# Patient Record
Sex: FEMALE | Race: BLACK OR AFRICAN AMERICAN | NOT HISPANIC OR LATINO | Employment: FULL TIME | URBAN - METROPOLITAN AREA
[De-identification: names, ages, dates, MRNs, and addresses within clinical notes are randomized per-mention and may not be internally consistent; named-entity substitution may affect disease eponyms.]

---

## 2020-11-09 ENCOUNTER — TRANSCRIBE ORDERS (OUTPATIENT)
Dept: NEUROLOGY | Facility: CLINIC | Age: 65
End: 2020-11-09

## 2020-11-09 DIAGNOSIS — M79.643 HAND PAIN: Primary | ICD-10-CM

## 2020-12-08 ENCOUNTER — TELEPHONE (OUTPATIENT)
Dept: NEUROLOGY | Facility: CLINIC | Age: 65
End: 2020-12-08

## 2020-12-10 ENCOUNTER — PROCEDURE VISIT (OUTPATIENT)
Dept: NEUROLOGY | Facility: CLINIC | Age: 65
End: 2020-12-10
Payer: COMMERCIAL

## 2020-12-10 DIAGNOSIS — M79.643 HAND PAIN: ICD-10-CM

## 2020-12-10 DIAGNOSIS — M79.643 PAIN OF HAND, UNSPECIFIED LATERALITY: Primary | ICD-10-CM

## 2020-12-10 PROCEDURE — 95910 NRV CNDJ TEST 7-8 STUDIES: CPT | Performed by: PSYCHIATRY & NEUROLOGY

## 2020-12-10 PROCEDURE — 95886 MUSC TEST DONE W/N TEST COMP: CPT | Performed by: PSYCHIATRY & NEUROLOGY

## 2023-10-10 ENCOUNTER — HOSPITAL ENCOUNTER (EMERGENCY)
Facility: HOSPITAL | Age: 68
End: 2023-10-10
Attending: EMERGENCY MEDICINE
Payer: COMMERCIAL

## 2023-10-10 ENCOUNTER — HOSPITAL ENCOUNTER (INPATIENT)
Facility: HOSPITAL | Age: 68
LOS: 2 days | Discharge: LEFT BEFORE DISCHARGE | End: 2023-10-12
Attending: INTERNAL MEDICINE | Admitting: INTERNAL MEDICINE
Payer: COMMERCIAL

## 2023-10-10 ENCOUNTER — APPOINTMENT (EMERGENCY)
Dept: RADIOLOGY | Facility: HOSPITAL | Age: 68
End: 2023-10-10
Payer: COMMERCIAL

## 2023-10-10 ENCOUNTER — APPOINTMENT (EMERGENCY)
Dept: CT IMAGING | Facility: HOSPITAL | Age: 68
End: 2023-10-10
Payer: COMMERCIAL

## 2023-10-10 ENCOUNTER — OFFICE VISIT (OUTPATIENT)
Dept: URGENT CARE | Facility: CLINIC | Age: 68
End: 2023-10-10
Payer: COMMERCIAL

## 2023-10-10 VITALS
RESPIRATION RATE: 20 BRPM | HEIGHT: 60 IN | HEART RATE: 97 BPM | TEMPERATURE: 98.9 F | DIASTOLIC BLOOD PRESSURE: 56 MMHG | OXYGEN SATURATION: 99 % | BODY MASS INDEX: 21.6 KG/M2 | SYSTOLIC BLOOD PRESSURE: 102 MMHG | WEIGHT: 110 LBS

## 2023-10-10 VITALS
SYSTOLIC BLOOD PRESSURE: 107 MMHG | DIASTOLIC BLOOD PRESSURE: 74 MMHG | HEART RATE: 102 BPM | RESPIRATION RATE: 18 BRPM | OXYGEN SATURATION: 99 % | TEMPERATURE: 101.7 F

## 2023-10-10 DIAGNOSIS — L03.119 CELLULITIS OF HAND: Primary | ICD-10-CM

## 2023-10-10 DIAGNOSIS — L03.90 CELLULITIS: ICD-10-CM
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
## 2023-10-10 DIAGNOSIS — R50.9 FEVER, UNSPECIFIED FEVER CAUSE: ICD-10-CM

## 2023-10-10 PROBLEM — Z91.018 MULTIPLE FOOD ALLERGIES: Status: ACTIVE | Noted: 2023-10-10

## 2023-10-10 LAB
ALBUMIN SERPL BCP-MCNC: 3.9 G/DL (ref 3.5–5)
ALP SERPL-CCNC: 45 U/L (ref 34–104)
ALT SERPL W P-5'-P-CCNC: 69 U/L (ref 7–52)
ANION GAP SERPL CALCULATED.3IONS-SCNC: 8 MMOL/L
AST SERPL W P-5'-P-CCNC: 64 U/L (ref 13–39)
BASOPHILS # BLD AUTO: 0.02 THOUSANDS/ÂΜL (ref 0–0.1)
BASOPHILS NFR BLD AUTO: 0 % (ref 0–1)
BILIRUB SERPL-MCNC: 0.57 MG/DL (ref 0.2–1)
BUN SERPL-MCNC: 26 MG/DL (ref 5–25)
CALCIUM SERPL-MCNC: 8.9 MG/DL (ref 8.4–10.2)
CHLORIDE SERPL-SCNC: 108 MMOL/L (ref 96–108)
CO2 SERPL-SCNC: 22 MMOL/L (ref 21–32)
CREAT SERPL-MCNC: 0.8 MG/DL (ref 0.6–1.3)
EOSINOPHIL # BLD AUTO: 0 THOUSAND/ÂΜL (ref 0–0.61)
EOSINOPHIL NFR BLD AUTO: 0 % (ref 0–6)
ERYTHROCYTE [DISTWIDTH] IN BLOOD BY AUTOMATED COUNT: 13.4 % (ref 11.6–15.1)
GFR SERPL CREATININE-BSD FRML MDRD: 75 ML/MIN/1.73SQ M
GLUCOSE SERPL-MCNC: 95 MG/DL (ref 65–140)
HCT VFR BLD AUTO: 38.9 % (ref 34.8–46.1)
HGB BLD-MCNC: 13.3 G/DL (ref 11.5–15.4)
IMM GRANULOCYTES # BLD AUTO: 0.04 THOUSAND/UL (ref 0–0.2)
IMM GRANULOCYTES NFR BLD AUTO: 0 % (ref 0–2)
LACTATE SERPL-SCNC: 0.8 MMOL/L (ref 0.5–2)
LYMPHOCYTES # BLD AUTO: 0.45 THOUSANDS/ÂΜL (ref 0.6–4.47)
LYMPHOCYTES NFR BLD AUTO: 5 % (ref 14–44)
MCH RBC QN AUTO: 33.6 PG (ref 26.8–34.3)
MCHC RBC AUTO-ENTMCNC: 34.2 G/DL (ref 31.4–37.4)
MCV RBC AUTO: 98 FL (ref 82–98)
MONOCYTES # BLD AUTO: 0.83 THOUSAND/ÂΜL (ref 0.17–1.22)
MONOCYTES NFR BLD AUTO: 9 % (ref 4–12)
NEUTROPHILS # BLD AUTO: 8.33 THOUSANDS/ÂΜL (ref 1.85–7.62)
NEUTS SEG NFR BLD AUTO: 86 % (ref 43–75)
NRBC BLD AUTO-RTO: 0 /100 WBCS
PLATELET # BLD AUTO: 161 THOUSANDS/UL (ref 149–390)
PMV BLD AUTO: 10.3 FL (ref 8.9–12.7)
POTASSIUM SERPL-SCNC: 3.4 MMOL/L (ref 3.5–5.3)
PROCALCITONIN SERPL-MCNC: 1.82 NG/ML
PROT SERPL-MCNC: 6.6 G/DL (ref 6.4–8.4)
RBC # BLD AUTO: 3.96 MILLION/UL (ref 3.81–5.12)
SODIUM SERPL-SCNC: 138 MMOL/L (ref 135–147)
WBC # BLD AUTO: 9.67 THOUSAND/UL (ref 4.31–10.16)

## 2023-10-10 PROCEDURE — 99223 1ST HOSP IP/OBS HIGH 75: CPT | Performed by: INTERNAL MEDICINE

## 2023-10-10 PROCEDURE — 73201 CT UPPER EXTREMITY W/DYE: CPT

## 2023-10-10 PROCEDURE — 96367 TX/PROPH/DG ADDL SEQ IV INF: CPT

## 2023-10-10 PROCEDURE — 99285 EMERGENCY DEPT VISIT HI MDM: CPT

## 2023-10-10 PROCEDURE — 99285 EMERGENCY DEPT VISIT HI MDM: CPT | Performed by: EMERGENCY MEDICINE

## 2023-10-10 PROCEDURE — 36415 COLL VENOUS BLD VENIPUNCTURE: CPT

## 2023-10-10 PROCEDURE — 80053 COMPREHEN METABOLIC PANEL: CPT

## 2023-10-10 PROCEDURE — 90471 IMMUNIZATION ADMIN: CPT

## 2023-10-10 PROCEDURE — 84145 PROCALCITONIN (PCT): CPT

## 2023-10-10 PROCEDURE — 99213 OFFICE O/P EST LOW 20 MIN: CPT | Performed by: PHYSICIAN ASSISTANT

## 2023-10-10 PROCEDURE — 87040 BLOOD CULTURE FOR BACTERIA: CPT

## 2023-10-10 PROCEDURE — 90715 TDAP VACCINE 7 YRS/> IM: CPT

## 2023-10-10 PROCEDURE — 85025 COMPLETE CBC W/AUTO DIFF WBC: CPT

## 2023-10-10 PROCEDURE — 73130 X-RAY EXAM OF HAND: CPT

## 2023-10-10 PROCEDURE — 96365 THER/PROPH/DIAG IV INF INIT: CPT

## 2023-10-10 PROCEDURE — 83605 ASSAY OF LACTIC ACID: CPT

## 2023-10-10 RX ORDER — ONDANSETRON 2 MG/ML
4 INJECTION INTRAMUSCULAR; INTRAVENOUS EVERY 6 HOURS PRN
Status: DISCONTINUED | OUTPATIENT
Start: 2023-10-10 | End: 2023-10-12 | Stop reason: HOSPADM

## 2023-10-10 RX ORDER — ACETAMINOPHEN 10 MG/ML
1000 INJECTION, SOLUTION INTRAVENOUS ONCE
Status: COMPLETED | OUTPATIENT
Start: 2023-10-10 | End: 2023-10-10

## 2023-10-10 RX ORDER — ACETAMINOPHEN 325 MG/1
650 TABLET ORAL EVERY 6 HOURS PRN
Status: DISCONTINUED | OUTPATIENT
Start: 2023-10-10 | End: 2023-10-11

## 2023-10-10 RX ORDER — KETOROLAC TROMETHAMINE 30 MG/ML
30 INJECTION, SOLUTION INTRAMUSCULAR; INTRAVENOUS EVERY 6 HOURS SCHEDULED
Status: DISCONTINUED | OUTPATIENT
Start: 2023-10-11 | End: 2023-10-11

## 2023-10-10 RX ORDER — ENOXAPARIN SODIUM 100 MG/ML
40 INJECTION SUBCUTANEOUS DAILY
Status: DISCONTINUED | OUTPATIENT
Start: 2023-10-11 | End: 2023-10-12 | Stop reason: HOSPADM

## 2023-10-10 RX ORDER — OXYCODONE HYDROCHLORIDE 5 MG/1
5 TABLET ORAL EVERY 4 HOURS PRN
Status: DISCONTINUED | OUTPATIENT
Start: 2023-10-10 | End: 2023-10-11

## 2023-10-10 RX ORDER — SODIUM CHLORIDE 9 MG/ML
100 INJECTION, SOLUTION INTRAVENOUS CONTINUOUS
Status: DISCONTINUED | OUTPATIENT
Start: 2023-10-10 | End: 2023-10-11

## 2023-10-10 RX ORDER — IBUPROFEN 400 MG/1
400 TABLET ORAL ONCE
Status: DISCONTINUED | OUTPATIENT
Start: 2023-10-10 | End: 2023-10-10

## 2023-10-10 RX ADMIN — AMPICILLIN SODIUM AND SULBACTAM SODIUM 3 G: 2; 1 INJECTION, POWDER, FOR SOLUTION INTRAMUSCULAR; INTRAVENOUS at 15:45

## 2023-10-10 RX ADMIN — IOHEXOL 100 ML: 350 INJECTION, SOLUTION INTRAVENOUS at 15:39

## 2023-10-10 RX ADMIN — AMPICILLIN SODIUM AND SULBACTAM SODIUM 3 G: 100; 50 INJECTION, POWDER, FOR SOLUTION INTRAVENOUS at 22:40

## 2023-10-10 RX ADMIN — ACETAMINOPHEN 1000 MG: 10 INJECTION INTRAVENOUS at 15:06

## 2023-10-10 RX ADMIN — IBUPROFEN 400 MG: 400 TABLET ORAL at 11:16

## 2023-10-10 RX ADMIN — SODIUM CHLORIDE 100 ML/HR: 0.9 INJECTION, SOLUTION INTRAVENOUS at 22:40

## 2023-10-10 RX ADMIN — SODIUM CHLORIDE 1000 ML: 0.9 INJECTION, SOLUTION INTRAVENOUS at 15:07

## 2023-10-10 RX ADMIN — KETOROLAC TROMETHAMINE 30 MG: 30 INJECTION, SOLUTION INTRAMUSCULAR; INTRAVENOUS at 23:38

## 2023-10-10 RX ADMIN — TETANUS TOXOID, REDUCED DIPHTHERIA TOXOID AND ACELLULAR PERTUSSIS VACCINE, ADSORBED 0.5 ML: 5; 2.5; 8; 8; 2.5 SUSPENSION INTRAMUSCULAR at 15:06

## 2023-10-10 NOTE — ED PROVIDER NOTES
History  Chief Complaint   Patient presents with   • Cat Bite     Pt bit by her cat this morning at 2am, pt bit twice on R hand. Strictly indoor cat      74 y/o female patient presents to the ER for cat bite. Patient stated that she was bitten by her cat around 0200 this morning. Throughout the day today her right wrist and forearm has become swollen with erythema spreading up her forearm from wound. There are noted four puncture wounds on the palmar aspect and volar wrist. Patient was seen by urgent care and sent to ER for further evaluation. Patient stated that the cat is strictly indoor cat. Cat is not up to date on vaccines. Cat is able to be observed for 10 days. patient does not know when she had a tetanus vaccine. Complains of decreased range of motion due to swelling. Sensation intact. Neurovascularly intact. History provided by:  Patient      Prior to Admission Medications   Prescriptions: None   Facility-Administered Medications Last Administration Doses Remaining   ibuprofen (MOTRIN) tablet 400 mg 10/10/2023 11:16 AM 0          History reviewed. No pertinent past medical history. History reviewed. No pertinent surgical history. History reviewed. No pertinent family history. I have reviewed and agree with the history as documented. E-Cigarette/Vaping     E-Cigarette/Vaping Substances     Social History     Tobacco Use   • Smoking status: Unknown       Review of Systems   Constitutional: Negative for chills and fever. Respiratory: Negative for cough and shortness of breath. Cardiovascular: Negative for chest pain. Gastrointestinal: Negative for abdominal pain, diarrhea, nausea and vomiting. Genitourinary: Negative for dysuria and hematuria. Musculoskeletal: Positive for joint swelling. Negative for back pain. Skin: Positive for rash and wound. Negative for color change. Neurological: Negative for dizziness, syncope and headaches.    All other systems reviewed and are negative. Physical Exam  Physical Exam  Vitals and nursing note reviewed. Constitutional:       General: She is not in acute distress. Appearance: She is well-developed. HENT:      Head: Normocephalic and atraumatic. Right Ear: External ear normal.      Left Ear: External ear normal.   Eyes:      Conjunctiva/sclera: Conjunctivae normal.   Cardiovascular:      Rate and Rhythm: Regular rhythm. Tachycardia present. Pulses: Normal pulses. Heart sounds: Normal heart sounds. Pulmonary:      Effort: Pulmonary effort is normal. No respiratory distress. Breath sounds: Normal breath sounds. Abdominal:      Palpations: Abdomen is soft. Tenderness: There is no abdominal tenderness. Musculoskeletal:      Right hand: Swelling and tenderness present. Decreased range of motion. Normal sensation. Normal capillary refill. Normal pulse. Cervical back: Neck supple. Skin:     General: Skin is warm and dry. Capillary Refill: Capillary refill takes less than 2 seconds. Findings: Erythema (right wrist spread to armpit) present. Neurological:      Mental Status: She is alert and oriented to person, place, and time. Mental status is at baseline.    Psychiatric:         Mood and Affect: Mood normal.         Behavior: Behavior normal.         Vital Signs  ED Triage Vitals   Temperature Pulse Respirations Blood Pressure SpO2   10/10/23 1201 10/10/23 1201 10/10/23 1201 10/10/23 1201 10/10/23 1201   98.9 °F (37.2 °C) (!) 107 20 132/59 98 %      Temp Source Heart Rate Source Patient Position - Orthostatic VS BP Location FiO2 (%)   10/10/23 1201 10/10/23 1647 10/10/23 1647 10/10/23 1647 --   Oral Monitor Sitting Left arm       Pain Score       --                  Vitals:    10/10/23 1201 10/10/23 1647   BP: 132/59 102/56   Pulse: (!) 107 97   Patient Position - Orthostatic VS:  Sitting         Visual Acuity      ED Medications  Medications   sodium chloride 0.9 % bolus 1,000 mL (0 mL Intravenous Stopped 10/10/23 1709)   tetanus-diphtheria-acellular pertussis (BOOSTRIX) IM injection 0.5 mL (0.5 mL Intramuscular Given 10/10/23 1506)   acetaminophen (Ofirmev) injection 1,000 mg (0 mg Intravenous Stopped 10/10/23 1531)   ampicillin-sulbactam (UNASYN) 3 g in sodium chloride 0.9 % 100 mL IVPB (0 g Intravenous Stopped 10/10/23 1709)   iohexol (OMNIPAQUE) 350 MG/ML injection (MULTI-DOSE) 100 mL (100 mL Intravenous Given 10/10/23 1539)       Diagnostic Studies  Results Reviewed     Procedure Component Value Units Date/Time    Procalcitonin [[id number]]  (Abnormal) Collected: 10/10/23 1459    Lab Status: Final result Specimen: Blood from Arm, Left Updated: 10/10/23 1533     Procalcitonin 1.82 ng/ml     Comprehensive metabolic panel [[id number]]  (Abnormal) Collected: 10/10/23 1458    Lab Status: Final result Specimen: Blood from Arm, Left Updated: 10/10/23 1527     Sodium 138 mmol/L      Potassium 3.4 mmol/L      Chloride 108 mmol/L      CO2 22 mmol/L      ANION GAP 8 mmol/L      BUN 26 mg/dL      Creatinine 0.80 mg/dL      Glucose 95 mg/dL      Calcium 8.9 mg/dL      AST 64 U/L      ALT 69 U/L      Alkaline Phosphatase 45 U/L      Total Protein 6.6 g/dL      Albumin 3.9 g/dL      Total Bilirubin 0.57 mg/dL      eGFR 75 ml/min/1.73sq m     Narrative:      [place] guidelines for Chronic Kidney Disease (CKD):   •  Stage 1 with normal or high GFR (GFR > 90 mL/min/1.73 square meters)  •  Stage 2 Mild CKD (GFR = 60-89 mL/min/1.73 square meters)  •  Stage 3A Moderate CKD (GFR = 45-59 mL/min/1.73 square meters)  •  Stage 3B Moderate CKD (GFR = 30-44 mL/min/1.73 square meters)  •  Stage 4 Severe CKD (GFR = 15-29 mL/min/1.73 square meters)  •  Stage 5 End Stage CKD (GFR <15 mL/min/1.73 square meters)  Note: GFR calculation is accurate only with a steady state creatinine    Lactic acid, plasma (w/reflex if result > 2.0) [[id number]]  (Normal) Collected: 10/10/23 9605    Lab Status: Final result Specimen: Blood from Arm, Left Updated: 10/10/23 1527     LACTIC ACID 0.8 mmol/L     Narrative:      Result may be elevated if tourniquet was used during collection. CBC and differential [[id number]]  (Abnormal) Collected: 10/10/23 1458    Lab Status: Final result Specimen: Blood from Arm, Left Updated: 10/10/23 1507     WBC 9.67 Thousand/uL      RBC 3.96 Million/uL      Hemoglobin 13.3 g/dL      Hematocrit 38.9 %      MCV 98 fL      MCH 33.6 pg      MCHC 34.2 g/dL      RDW 13.4 %      MPV 10.3 fL      Platelets 535 Thousands/uL      nRBC 0 /100 WBCs      Neutrophils Relative 86 %      Immat GRANS % 0 %      Lymphocytes Relative 5 %      Monocytes Relative 9 %      Eosinophils Relative 0 %      Basophils Relative 0 %      Neutrophils Absolute 8.33 Thousands/µL      Immature Grans Absolute 0.04 Thousand/uL      Lymphocytes Absolute 0.45 Thousands/µL      Monocytes Absolute 0.83 Thousand/µL      Eosinophils Absolute 0.00 Thousand/µL      Basophils Absolute 0.02 Thousands/µL     Blood culture #1 [[id number]] Collected: 10/10/23 1458    Lab Status: In process Specimen: Blood from Arm, Left Updated: 10/10/23 1505    Blood culture #2 [[id number]] Collected: 10/10/23 1458    Lab Status: In process Specimen: Blood from Hand, Left Updated: 10/10/23 1505                 CT upper extremity w contrast right   Final Result by Calli Freitas MD (10/10 1627)      There is no deep intramuscular collection seen      Cellulitic changes at the volar aspect of the hand overlying the area of the thenar eminence   No lytic lesion or periosteal reaction      No elbow joint effusion      Workstation performed: [id number]         XR hand 3+ views RIGHT   Final Result by Vale Arreguin MD (10/10 1650)   Soft tissue swelling. No acute osseous abnormality.             Workstation performed: [id number]                    Procedures  Procedures         ED Course  ED Course as of 10/10/23 1719   Tue Oct 10, 2023   1436 Spoke with Hand surgery on call and recommended transfer to [facility] for admission to medicine. NPO after midnight, continue IV Abx, wound care. Patient in agreement for transport. PAC working on placement. SBIRT 22yo+    Flowsheet Row Most Recent Value   Initial Alcohol Screen: US AUDIT-C     1. How often do you have a drink containing alcohol? 0 Filed at: 10/10/2023 1709   2. How many drinks containing alcohol do you have on a typical day you are drinking? 0 Filed at: 10/10/2023 1709   3b. FEMALE Any Age, or MALE 65+: How often do you have 4 or more drinks on one occassion? 0 Filed at: 10/10/2023 1709   Audit-C Score 0 Filed at: 10/10/2023 1709   DIVYA: How many times in the past year have you. .. Used an illegal drug or used a prescription medication for non-medical reasons? Never Filed at: 10/10/2023 1709                    Medical Decision Making  59-year-old female presents to the ER for evaluation of local erythema, warmth, swelling concerning for cellulitis. Sensitivity/pain to light touch around the erythematous area. Positive lymphatic spread visible with no fluid pockets of fluctuance concerning for abscess noted. Patient was given Unasyn IV and IV Tylenol. Tetanus was updated today. Deferred rabies vaccine at this time due to cat being able to be observed. Patient's case was discussed with hand surgery and was advised to transfer patient to [facility] for continued antibiotics and possible procedure tomorrow if indicated. Patient agreed with plan of care and did decide to drive herself to [facility] for admission. Patient's case accepted by Dr. Evelio Diamond with consult to hand surgery. Amount and/or Complexity of Data Reviewed  Labs: ordered. Radiology: ordered. Risk  Prescription drug management.                Disposition  Final diagnoses:   Cat bite, initial encounter   Cellulitis     Time reflects when diagnosis was documented in both MDM as applicable and the Disposition within this note     Time User Action Codes Description Comment    10/10/2023  3:49 PM Cassy Beltre Add [W55.01XA] Cat bite, initial encounter     10/10/2023  3:49 PM Cassy Beltre Add [M63.97] Cellulitis       ED Disposition     ED Disposition   Transfer to Another Facility-In Network    Condition   --    Date/Time   Tue Oct 10, 2023  2:51 PM    Comment   Paola Louis should be transferred out to [facility]. MD Documentation    Flowsheet Row Most Recent Value   Patient Condition The patient has been stabilized such that within reasonable medical probability, no material deterioration of the patient condition or the condition of the unborn child(dasha) is likely to result from the transfer   Reason for Transfer Level of Care needed not available at this facility   Benefits of Transfer Specialized equipment and/or services available at the receiving facility (Include comment)________________________   Risks of Transfer Potential for delay in receiving treatment   Accepting Physician Dr. Mauro Turcios Name, [facility]. [facility] (Name & Tel number) [facility]   Sending MD Dr. Akiko Chris,  Cassy Beltre, [address] [facility]   Provider Certification General risk, such as traffic hazards, adverse weather conditions, rough terrain or turbulence, possible failure of equipment (including vehicle or aircraft), or consequences of actions of persons outside the control of the transport personnel      RN Documentation    [address] Name, [facility]. [facility] (Name & Tel number) [facility]      Follow-up Information    None         Patient's Medications    No medications on file       No discharge procedures on file.     PDMP Review     None          ED Provider  Electronically Signed by           Cassy Beltre, [address] [facility]  10/10/23 1720

## 2023-10-10 NOTE — PLAN OF CARE
Problem: PAIN - ADULT  Goal: Verbalizes/displays adequate comfort level or baseline comfort level  Description: Interventions:  - Encourage patient to monitor pain and request assistance  - Assess pain using appropriate pain scale  - Administer analgesics based on type and severity of pain and evaluate response  - Implement non-pharmacological measures as appropriate and evaluate response  - Consider cultural and social influences on pain and pain management  - Notify physician/advanced practitioner if interventions unsuccessful or patient reports new pain  Outcome: Progressing     Problem: INFECTION - ADULT  Goal: Absence or prevention of progression during hospitalization  Description: INTERVENTIONS:  - Assess and monitor for signs and symptoms of infection  - Monitor lab/diagnostic results  - Monitor all insertion sites, i.e. indwelling lines, tubes, and drains  - Monitor endotracheal if appropriate and nasal secretions for changes in amount and color  - [place] appropriate cooling/warming therapies per order  - Administer medications as ordered  - Instruct and encourage patient and family to use good hand hygiene technique  - Identify and instruct in appropriate isolation precautions for identified infection/condition  Outcome: Progressing  Goal: Absence of fever/infection during neutropenic period  Description: INTERVENTIONS:  - Monitor WBC    Outcome: Progressing     Problem: SAFETY ADULT  Goal: Patient will remain free of falls  Description: INTERVENTIONS:  - Educate patient/family on patient safety including physical limitations  - Instruct patient to call for assistance with activity   - Consult OT/PT to assist with strengthening/mobility   - Keep Call bell within reach  - Keep bed low and locked with side rails adjusted as appropriate  - Keep care items and personal belongings within reach  - Initiate and maintain comfort rounds  - Make Fall Risk Sign visible to staff  - Offer Toileting every  Hours, in advance of need  - Initiate/Maintain alarm  - Obtain necessary fall risk management equipment  - Apply yellow socks and bracelet for high fall risk patients  - Consider moving patient to room near nurses station  Outcome: Progressing  Goal: Maintain or return to baseline ADL function  Description: INTERVENTIONS:  -  Assess patient's ability to carry out ADLs; assess patient's baseline for ADL function and identify physical deficits which impact ability to perform ADLs (bathing, care of mouth/teeth, toileting, grooming, dressing, etc.)  - Assess/evaluate cause of self-care deficits   - Assess range of motion  - Assess patient's mobility; develop plan if impaired  - Assess patient's need for assistive devices and provide as appropriate  - Encourage maximum independence but intervene and supervise when necessary  - Involve family in performance of ADLs  - Assess for home care needs following discharge   - Consider OT consult to assist with ADL evaluation and planning for discharge  - Provide patient education as appropriate  Outcome: Progressing  Goal: Maintains/Returns to pre admission functional level  Description: INTERVENTIONS:  - Perform BMAT or MOVE assessment daily.   - Set and communicate daily mobility goal to care team and patient/family/caregiver. - Collaborate with rehabilitation services on mobility goals if consulted  - Perform Range of Motion  times a day. - Reposition patient every  hours.   - Dangle patient  times a day  - Stand patient  times a day  - Ambulate patient times a day  - Out of bed to chair  times a day   - Out of bed for meals  times a day  - Out of bed for toileting  - Record patient progress and toleration of activity level   Outcome: Progressing     Problem: DISCHARGE PLANNING  Goal: Discharge to home or other facility with appropriate resources  Description: INTERVENTIONS:  - Identify barriers to discharge w/patient and caregiver  - Arrange for needed discharge resources and transportation as appropriate  - Identify discharge learning needs (meds, wound care, etc.)  - Arrange for interpretive services to assist at discharge as needed  - Refer to Case Management Department for coordinating discharge planning if the patient needs post-hospital services based on physician/advanced practitioner order or complex needs related to functional status, cognitive ability, or social support system  Outcome: Progressing     Problem: Knowledge Deficit  Goal: Patient/family/caregiver demonstrates understanding of disease process, treatment plan, medications, and discharge instructions  Description: Complete learning assessment and assess knowledge base.   Interventions:  - Provide teaching at level of understanding  - Provide teaching via preferred learning methods  Outcome: Progressing

## 2023-10-10 NOTE — PROGRESS NOTES
[facility]        NAME: Trupti Mireles is a 75 y.o. female  : 1955    MRN: [id number]  DATE: October 10, 2023  TIME: 11:07 AM    Assessment and Plan   Cat bite, initial encounter [W55.01XA]  1. Cat bite, initial encounter  Transfer to other facility      2. Fever, unspecified fever cause  ibuprofen (MOTRIN) tablet 400 mg            Patient Instructions   She was given 400 of ibuprofen for fever. Patient will proceed to the ER via private vehicle. Follow up with PCP in 3-5 days. Proceed to  ER if symptoms worsen. Chief Complaint     Chief Complaint   Patient presents with   • Cat Bite     It happen this morning the cat bite her in the right hand and is now having chills, and body ache and pain don't go away. No tetus shot ever. History of Present Illness       HPI  This is a 59-year-old female here complaining of swelling of the right hand after a cat bite which she notes occurred at 2 AM this morning. She notes she clean the area after the cat bite and took Advil at 2 AM.  She currently is here with a temp and noting her hand pain is a 9 out of 10. She notes there is swelling and red streaking up her arm. She denies vomiting, diarrhea, shortness of breath or chest pain. Review of Systems   Review of Systems   Constitutional: Positive for fever. Respiratory: Negative for shortness of breath. Cardiovascular: Negative for chest pain. Gastrointestinal: Negative for diarrhea and vomiting. Musculoskeletal: Positive for arthralgias. Skin: Positive for wound. Current Medications     No current outpatient medications on file.     Current Facility-Administered Medications:   •  ibuprofen (MOTRIN) tablet 400 mg, 400 mg, Oral, Once, Lindsey Mcfarland PA-C    Current Allergies     Allergies as of 10/10/2023 - Reviewed 10/10/2023   Allergen Reaction Noted   • Sugar-protein-starch - food allergy Hives 10/10/2023            The following portions of the patient's history were reviewed and updated as appropriate: allergies, current medications, past family history, past medical history, past social history, past surgical history and problem list.     No past medical history on file. No past surgical history on file. History reviewed. No pertinent family history. Medications have been verified. Objective   /74   Pulse 102   Temp (!) 101.7 °F (38.7 °C)   Resp 18   SpO2 99%        Physical Exam     Physical Exam  Vitals and nursing note reviewed. Constitutional:       General: She is not in acute distress. Appearance: She is ill-appearing. Cardiovascular:      Rate and Rhythm: Normal rate and regular rhythm. Pulmonary:      Effort: Pulmonary effort is normal.      Breath sounds: Normal breath sounds. Musculoskeletal:      Right hand: Swelling, laceration and tenderness present. Decreased range of motion. Normal capillary refill. Left hand: Normal.      Comments: There is significant edema, induration, and pain to palpation of the thumb and palm of the right hand. There is noted puncture marks on the palm. There is also noted streaking up the arm. Patient has limited motion of the thumb. See photo.

## 2023-10-10 NOTE — DISCHARGE INSTRUCTIONS
Patient to go to [facility] floor 4 room 434  Patient accepted under Dr. Beni Pimentel

## 2023-10-10 NOTE — EMTALA/ACUTE CARE TRANSFER
[address] [facility] [address]  Dept: [phone]      EMTALA TRANSFER CONSENT    NAME Rosie Lane                                         1955                              MRN [id number]    I have been informed of my rights regarding examination, treatment, and transfer   by Dr. Sunita Lowe MD    Benefits: Specialized equipment and/or services available at the receiving facility (Include comment)________________________    Risks: Potential for delay in receiving treatment      Consent for Transfer:  I acknowledge that my medical condition has been evaluated and explained to me by the emergency department physician or other qualified medical person and/or my attending physician, who has recommended that I be transferred to the service of  Accepting Physician: Dr. Ramón Romero at [address] [place] [address] [address] Name, [address] : [address] [place] [address]. The above potential benefits of such transfer, the potential risks associated with such transfer, and the probable risks of not being transferred have been explained to me, and I fully understand them. The doctor has explained that, in my case, the benefits of transfer outweigh the risks. I agree to be transferred. I authorize the performance of emergency medical procedures and treatments upon me in both transit and upon arrival at the receiving facility. Additionally, I authorize the release of any and all medical records to the receiving facility and request they be transported with me, if possible. I understand that the safest mode of transportation during a medical emergency is an ambulance and that the Hospital advocates the use of this mode of transport. Risks of traveling to the receiving facility by car, including absence of medical control, life sustaining equipment, such as oxygen, and medical personnel has been explained to me and I fully understand them.     ([address] Quality  BELOW)  [  ]  I consent to the stated transfer and to be transported by ambulance/helicopter. [  ]  I consent to the stated transfer, but refuse transportation by ambulance and accept full responsibility for my transportation by car. I understand the risks of non-ambulance transfers and I exonerate the Hospital and its staff from any deterioration in my condition that results from this refusal.    X___________________________________________    DATE  10/10/23  TIME________  Signature of patient or legally responsible individual signing on patient behalf           RELATIONSHIP TO PATIENT_________________________          Provider Certification    NAME Damian [facility]                                         1955                              MRN [id number]    A medical screening exam was performed on the above named patient. Based on the examination:    Condition Necessitating Transfer There were no encounter diagnoses. Patient Condition: The patient has been stabilized such that within reasonable medical probability, no material deterioration of the patient condition or the condition of the unborn child(dasha) is likely to result from the transfer    Reason for Transfer: Level of Care needed not available at this facility    Transfer Requirements: [address]   · Space available and qualified personnel available for treatment as acknowledged by Magdalene Denver  · Agreed to accept transfer and to provide appropriate medical treatment as acknowledged by       Dr. Maribell Bates  · Appropriate medical records of the examination and treatment of the patient are provided at the time of transfer   [address] [facility] [address] _______  · Transfer will be performed by qualified personnel from    and appropriate transfer equipment as required, including the use of necessary and appropriate life support measures.     Provider Certification: I have examined the patient and explained the following risks and benefits of being transferred/refusing transfer to the patient/family:  General risk, such as traffic hazards, adverse weather conditions, rough terrain or turbulence, possible failure of equipment (including vehicle or aircraft), or consequences of actions of persons outside the control of the transport personnel      Based on these reasonable risks and benefits to the patient and/or the unborn child(dasha), and based upon the information available at the time of the patient’s examination, I certify that the medical benefits reasonably to be expected from the provision of appropriate medical treatments at another medical facility outweigh the increasing risks, if any, to the individual’s medical condition, and in the case of labor to the unborn child, from effecting the transfer.     X____________________________________________ DATE 10/10/23        TIME_______      ORIGINAL - SEND TO MEDICAL RECORDS   COPY - SEND WITH PATIENT DURING TRANSFER

## 2023-10-10 NOTE — ED NOTES
Dressing applied following the directions within the nursing communication     Wet dressing of 50% betadine and 50% NS, covered with a dry dressing.       Missael Cisneros  10/10/23 0851

## 2023-10-11 PROBLEM — R65.21 SEPTIC SHOCK (HCC): Status: ACTIVE | Noted: 2023-10-11

## 2023-10-11 PROBLEM — R11.0 NAUSEA: Status: ACTIVE | Noted: 2023-10-11

## 2023-10-11 PROBLEM — A41.9 SEPTIC SHOCK (HCC): Status: ACTIVE | Noted: 2023-10-11

## 2023-10-11 LAB
ANION GAP SERPL CALCULATED.3IONS-SCNC: 4 MMOL/L
ANISOCYTOSIS BLD QL SMEAR: PRESENT
BASOPHILS # BLD MANUAL: 0 THOUSAND/UL (ref 0–0.1)
BASOPHILS NFR MAR MANUAL: 0 % (ref 0–1)
BUN SERPL-MCNC: 17 MG/DL (ref 5–25)
CALCIUM SERPL-MCNC: 7.6 MG/DL (ref 8.4–10.2)
CHLORIDE SERPL-SCNC: 111 MMOL/L (ref 96–108)
CO2 SERPL-SCNC: 25 MMOL/L (ref 21–32)
CREAT SERPL-MCNC: 0.77 MG/DL (ref 0.6–1.3)
EOSINOPHIL # BLD MANUAL: 0.12 THOUSAND/UL (ref 0–0.4)
EOSINOPHIL NFR BLD MANUAL: 1 % (ref 0–6)
ERYTHROCYTE [DISTWIDTH] IN BLOOD BY AUTOMATED COUNT: 14.1 % (ref 11.6–15.1)
GFR SERPL CREATININE-BSD FRML MDRD: 79 ML/MIN/1.73SQ M
GLUCOSE SERPL-MCNC: 84 MG/DL (ref 65–140)
HCT VFR BLD AUTO: 30.2 % (ref 34.8–46.1)
HCT VFR BLD AUTO: 33 % (ref 34.8–46.1)
HGB BLD-MCNC: 10.2 G/DL (ref 11.5–15.4)
HGB BLD-MCNC: 10.9 G/DL (ref 11.5–15.4)
LACTATE SERPL-SCNC: 1 MMOL/L (ref 0.5–2)
LYMPHOCYTES # BLD AUTO: 0.94 THOUSAND/UL (ref 0.6–4.47)
LYMPHOCYTES # BLD AUTO: 8 % (ref 14–44)
MCH RBC QN AUTO: 33.4 PG (ref 26.8–34.3)
MCHC RBC AUTO-ENTMCNC: 33 G/DL (ref 31.4–37.4)
MCV RBC AUTO: 101 FL (ref 82–98)
METAMYELOCYTES NFR BLD MANUAL: 1 % (ref 0–1)
MONOCYTES # BLD AUTO: 0.47 THOUSAND/UL (ref 0–1.22)
MONOCYTES NFR BLD: 4 % (ref 4–12)
MYELOCYTES NFR BLD MANUAL: 2 % (ref 0–1)
NEUTROPHILS # BLD MANUAL: 9.85 THOUSAND/UL (ref 1.85–7.62)
NEUTS BAND NFR BLD MANUAL: 30 % (ref 0–8)
NEUTS SEG NFR BLD AUTO: 54 % (ref 43–75)
PLATELET # BLD AUTO: 135 THOUSANDS/UL (ref 149–390)
PLATELET BLD QL SMEAR: ABNORMAL
PMV BLD AUTO: 10.7 FL (ref 8.9–12.7)
POTASSIUM SERPL-SCNC: 3.5 MMOL/L (ref 3.5–5.3)
RBC # BLD AUTO: 3.26 MILLION/UL (ref 3.81–5.12)
RBC MORPH BLD: PRESENT
SODIUM SERPL-SCNC: 140 MMOL/L (ref 135–147)
WBC # BLD AUTO: 11.73 THOUSAND/UL (ref 4.31–10.16)

## 2023-10-11 PROCEDURE — 85027 COMPLETE CBC AUTOMATED: CPT | Performed by: INTERNAL MEDICINE

## 2023-10-11 PROCEDURE — 99222 1ST HOSP IP/OBS MODERATE 55: CPT | Performed by: STUDENT IN AN ORGANIZED HEALTH CARE EDUCATION/TRAINING PROGRAM

## 2023-10-11 PROCEDURE — 85014 HEMATOCRIT: CPT | Performed by: INTERNAL MEDICINE

## 2023-10-11 PROCEDURE — 85007 BL SMEAR W/DIFF WBC COUNT: CPT | Performed by: INTERNAL MEDICINE

## 2023-10-11 PROCEDURE — 80048 BASIC METABOLIC PNL TOTAL CA: CPT | Performed by: INTERNAL MEDICINE

## 2023-10-11 PROCEDURE — 99222 1ST HOSP IP/OBS MODERATE 55: CPT | Performed by: NURSE PRACTITIONER

## 2023-10-11 PROCEDURE — 85018 HEMOGLOBIN: CPT | Performed by: INTERNAL MEDICINE

## 2023-10-11 PROCEDURE — 83605 ASSAY OF LACTIC ACID: CPT | Performed by: NURSE PRACTITIONER

## 2023-10-11 PROCEDURE — 87081 CULTURE SCREEN ONLY: CPT | Performed by: NURSE PRACTITIONER

## 2023-10-11 PROCEDURE — 99232 SBSQ HOSP IP/OBS MODERATE 35: CPT | Performed by: INTERNAL MEDICINE

## 2023-10-11 RX ORDER — OXYCODONE HYDROCHLORIDE 5 MG/1
5 TABLET ORAL EVERY 4 HOURS PRN
Status: DISCONTINUED | OUTPATIENT
Start: 2023-10-11 | End: 2023-10-12 | Stop reason: HOSPADM

## 2023-10-11 RX ORDER — ACETAMINOPHEN 325 MG/1
975 TABLET ORAL EVERY 8 HOURS SCHEDULED
Status: DISCONTINUED | OUTPATIENT
Start: 2023-10-11 | End: 2023-10-12 | Stop reason: HOSPADM

## 2023-10-11 RX ORDER — ALBUMIN, HUMAN INJ 5% 5 %
12.5 SOLUTION INTRAVENOUS ONCE
Status: DISCONTINUED | OUTPATIENT
Start: 2023-10-11 | End: 2023-10-11

## 2023-10-11 RX ORDER — PANTOPRAZOLE SODIUM 40 MG/10ML
40 INJECTION, POWDER, LYOPHILIZED, FOR SOLUTION INTRAVENOUS
Status: DISCONTINUED | OUTPATIENT
Start: 2023-10-11 | End: 2023-10-12 | Stop reason: HOSPADM

## 2023-10-11 RX ORDER — ALBUMIN (HUMAN) 12.5 G/50ML
25 SOLUTION INTRAVENOUS ONCE
Status: COMPLETED | OUTPATIENT
Start: 2023-10-11 | End: 2023-10-12

## 2023-10-11 RX ORDER — AMOXICILLIN 250 MG
1 CAPSULE ORAL 2 TIMES DAILY PRN
Status: DISCONTINUED | OUTPATIENT
Start: 2023-10-11 | End: 2023-10-12 | Stop reason: HOSPADM

## 2023-10-11 RX ORDER — POLYETHYLENE GLYCOL 3350 17 G/17G
17 POWDER, FOR SOLUTION ORAL DAILY
Status: DISCONTINUED | OUTPATIENT
Start: 2023-10-11 | End: 2023-10-12 | Stop reason: HOSPADM

## 2023-10-11 RX ORDER — ALBUMIN (HUMAN) 12.5 G/50ML
12.5 SOLUTION INTRAVENOUS ONCE
Status: COMPLETED | OUTPATIENT
Start: 2023-10-11 | End: 2023-10-11

## 2023-10-11 RX ORDER — AMOXICILLIN 250 MG
1 CAPSULE ORAL AS NEEDED
Status: DISCONTINUED | OUTPATIENT
Start: 2023-10-11 | End: 2023-10-11

## 2023-10-11 RX ADMIN — ALBUMIN (HUMAN) 12.5 G: 0.25 INJECTION, SOLUTION INTRAVENOUS at 16:44

## 2023-10-11 RX ADMIN — AMPICILLIN SODIUM AND SULBACTAM SODIUM 3 G: 100; 50 INJECTION, POWDER, FOR SOLUTION INTRAVENOUS at 11:15

## 2023-10-11 RX ADMIN — SODIUM CHLORIDE 1000 ML: 0.9 INJECTION, SOLUTION INTRAVENOUS at 08:56

## 2023-10-11 RX ADMIN — SODIUM CHLORIDE 500 ML: 0.9 INJECTION, SOLUTION INTRAVENOUS at 17:39

## 2023-10-11 RX ADMIN — ALBUMIN (HUMAN) 25 G: 0.25 INJECTION, SOLUTION INTRAVENOUS at 21:02

## 2023-10-11 RX ADMIN — AMPICILLIN SODIUM AND SULBACTAM SODIUM 3 G: 100; 50 INJECTION, POWDER, FOR SOLUTION INTRAVENOUS at 16:59

## 2023-10-11 RX ADMIN — ONDANSETRON 4 MG: 2 INJECTION INTRAMUSCULAR; INTRAVENOUS at 15:02

## 2023-10-11 RX ADMIN — KETOROLAC TROMETHAMINE 30 MG: 30 INJECTION, SOLUTION INTRAMUSCULAR; INTRAVENOUS at 05:49

## 2023-10-11 RX ADMIN — CEFEPIME 2000 MG: 2 INJECTION, POWDER, FOR SOLUTION INTRAVENOUS at 23:39

## 2023-10-11 RX ADMIN — KETOROLAC TROMETHAMINE 30 MG: 30 INJECTION, SOLUTION INTRAMUSCULAR; INTRAVENOUS at 11:16

## 2023-10-11 RX ADMIN — AMPICILLIN SODIUM AND SULBACTAM SODIUM 3 G: 100; 50 INJECTION, POWDER, FOR SOLUTION INTRAVENOUS at 22:39

## 2023-10-11 RX ADMIN — AMPICILLIN SODIUM AND SULBACTAM SODIUM 3 G: 100; 50 INJECTION, POWDER, FOR SOLUTION INTRAVENOUS at 04:44

## 2023-10-11 NOTE — UTILIZATION REVIEW
Initial Clinical Review    Admission: Date/Time/Statement:   Admission Orders (From admission, onward)       Ordered        10/10/23 1936  Inpatient Admission  Once                          Orders Placed This Encounter   Procedures    Inpatient Admission     Standing Status:   Standing     Number of Occurrences:   1     Order Specific Question:   Level of Care     Answer:   Med Surg [16]     Order Specific Question:   Estimated length of stay     Answer:   More than 2 Midnights     Order Specific Question:   Certification     Answer:   I certify that inpatient services are medically necessary for this patient for a duration of greater than two midnights. See H&P and MD Progress Notes for additional information about the patient's course of treatment. Initial Presentation:Transferred from [facility]  75 y.o. female with a PMH of multiple food allergies and also fibroid uterus for which she had hysterectomy who presents with swelling and pain of her right hand following 2 bites by her cat around 2 AM last night. The patient states that the cat is her own cat that she has had for about 2 years since the cat was 4 weeks old and she states that the cat has been in heat and fighting with its sister and when the patient was giving the cat water to try to quell the cat, the cat bit her. The patient states that she was in shock as the cat had never bitten her before and had then gone to push the cat away but the cat bit her second time much harder. The patient stated that her hand was "gushing blood" after the 2 bites and she initially went over to the urgent care. The patient notes that when she was at the urgent care that she had shaking chills/rigors all over to the point where she cannot even fill out papers on her own. She noted that when they took her temperature it was about 101.9 and she was given Tylenol.   The patient was sent to the hospital for evaluation and when seen in the emergency department at the [facility], she was given tetanus vaccine but because of difficulty that she was having with moving the hand, it was felt that the patient would require hand surgery evaluation to evaluate for potential deeper infection such as a tenosynovitis. Transferred to [address] [place] [address],[address] admitted to inpatient status for cellulitis hand. Started on IVABT, cultures pending. Date: 10/11/23   Day 2:   IVABT in progress for cellulitis hand s/p cat bite. Ortho consulted. Elevate & NWB R hand, heat to affected area. NPO after MN pending re-evaluation in am per ortho    Per ortho: right hand and wrist swelling, s/p cat bite   Exam:  Right hand with moderate swelling to the palm. Small puncture wounds over the thenar musculature. FPL EPL intact. FDS and FDP intact to all digits. Able to make a loose fist.  Full digit extension. No palpable fluid collections. One of the most distal wound. tiny amount of purulence noted at distal wound  5/5 APB strength  Two-point discrimination intact to all digits over radial and ulnar aspect.     ED Triage Vitals   Temperature Pulse Respirations Blood Pressure SpO2   10/10/23 1827 10/10/23 1830 10/10/23 1827 10/10/23 1827 10/10/23 1830   98.4 °F (36.9 °C) (!) 112 18 99/61 94 %      Temp Source Heart Rate Source Patient Position - Orthostatic VS BP Location FiO2 (%)   10/10/23 1830 -- -- 10/11/23 0816 --   Oral   Left arm       Pain Score       10/10/23 1820       7          Wt Readings from Last 1 Encounters:   10/10/23 49.9 kg (110 lb 0.2 oz)     Additional Vital Signs:   Date/Time Temp Pulse Resp BP MAP (mmHg) SpO2   10/11/23 0845 -- -- -- 80/48 Abnormal   -- --   BP: provider notified at 10/11/23 0845   10/11/23 0816 -- -- -- 86/58 Abnormal  -- --   10/11/23 08:04:35 97.8 °F (36.6 °C) 78 18 -- -- 96 %   10/10/23 23:50:06 98.1 °F (36.7 °C) 93 16 99/60 73 98 %   10/10/23 18:30:11 98.4 °F (36.9 °C) 112 Abnormal  18 99/61 74 94 %   10/10/23 18:27:23 98.4 °F (36.9 °C) -- 18 99/61 74 -- Pertinent Labs/Diagnostic Test Results:   10/10 CT RUE @ [facility] prior to transfer  There is no deep intramuscular collection seen  Cellulitic changes at the volar aspect of the hand overlying the area of the thenar eminence  No lytic lesion or periosteal reaction  No elbow joint effusion    10/10 XR hand 3+ views RIGHT @ [facility] prior to transfer  Soft tissue swelling. No acute osseous abnormality. Results from last 7 days   Lab Units 10/11/23  0604 10/10/23  1458   WBC Thousand/uL 11.73* 9.67   HEMOGLOBIN g/dL 10.9* 13.3   HEMATOCRIT % 33.0* 38.9   PLATELETS Thousands/uL 135* 161   NEUTROS ABS Thousands/µL  --  8.33*   BANDS PCT % 30*  --          Results from last 7 days   Lab Units 10/11/23  0604 10/10/23  1458   SODIUM mmol/L 140 138   POTASSIUM mmol/L 3.5 3.4*   CHLORIDE mmol/L 111* 108   CO2 mmol/L 25 22   ANION GAP mmol/L 4 8   BUN mg/dL 17 26*   CREATININE mg/dL 0.77 0.80   EGFR ml/min/1.73sq m 79 75   CALCIUM mg/dL 7.6* 8.9     Results from last 7 days   Lab Units 10/10/23  1458   AST U/L 64*   ALT U/L 69*   ALK PHOS U/L 45   TOTAL PROTEIN g/dL 6.6   ALBUMIN g/dL 3.9   TOTAL BILIRUBIN mg/dL 0.57       Results from last 7 days   Lab Units 10/11/23  0604 10/10/23  1458   GLUCOSE RANDOM mg/dL 84 95       Results from last 7 days   Lab Units 10/10/23  1459   PROCALCITONIN ng/ml 1.82*     Results from last 7 days   Lab Units 10/10/23  1458   LACTIC ACID mmol/L 0.8       Results from last 7 days   Lab Units 10/10/23  1458   BLOOD CULTURE  Received in Microbiology Lab. Culture in Progress. Received in Microbiology Lab. Culture in Progress.      Past Medical History:   Diagnosis Date    Fibrocystic breast changes of both breasts     Multiple food allergies     Post menopausal problems      Present on Admission:   Cat bite    Admitting Diagnosis: Cellulitis [L03.90]  Age/Sex: 75 y.o. female  Admission Orders:  Consult hand surgery  Scd/foot pumps  Elevate RUE    Scheduled Medications:  ampicillin-sulbactam, 3 g, Intravenous, Q6H  enoxaparin, 40 mg, Subcutaneous, Daily  ketorolac, 30 mg, Intravenous, Q6H SILVIANO  sodium chloride, 1,000 mL, Intravenous, Once    Continuous IV Infusions:  sodium chloride, 100 mL/hr, Intravenous, Continuous    PRN Meds:  acetaminophen, 650 mg, Oral, Q6H PRN  ondansetron, 4 mg, Intravenous, Q6H PRN  oxyCODONE, 5 mg, Oral, Q4H PRN  oxyCODONE, 2.5 mg, Oral, Q4H PRN      Network Utilization Review Department  ATTENTION: Please call with any questions or concerns to [phone] and carefully listen to the prompts so that you are directed to the right person. All voicemails are confidential.   For Discharge needs, contact Care Management DC Support Team at [phone] opt. 2  Send all requests for admission clinical reviews, approved or denied determinations and any other requests to dedicated fax number below belonging to the campus where the patient is receiving treatment.  List of dedicated fax numbers for the Facilities:  [place] DENIALS (Administrative/Medical Necessity) [phone]   DISCHARGE SUPPORT TEAM (NETWORK) [id number] [facility] (Maternity/NICU/Pediatrics) [phone]   [address] [facility] [phone]   [address] [place] [address] Road [address] [facility] [address] [facility] [address] [address] [facility] [address] [phone]

## 2023-10-11 NOTE — PLAN OF CARE
Problem: PAIN - ADULT  Goal: Verbalizes/displays adequate comfort level or baseline comfort level  Description: Interventions:  - Encourage patient to monitor pain and request assistance  - Assess pain using appropriate pain scale  - Administer analgesics based on type and severity of pain and evaluate response  - Implement non-pharmacological measures as appropriate and evaluate response  - Consider cultural and social influences on pain and pain management  - Notify physician/advanced practitioner if interventions unsuccessful or patient reports new pain  Outcome: Progressing     Problem: INFECTION - ADULT  Goal: Absence or prevention of progression during hospitalization  Description: INTERVENTIONS:  - Assess and monitor for signs and symptoms of infection  - Monitor lab/diagnostic results  - Monitor all insertion sites, i.e. indwelling lines, tubes, and drains  - Monitor endotracheal if appropriate and nasal secretions for changes in amount and color  - [place] appropriate cooling/warming therapies per order  - Administer medications as ordered  - Instruct and encourage patient and family to use good hand hygiene technique  - Identify and instruct in appropriate isolation precautions for identified infection/condition  Outcome: Progressing  Goal: Absence of fever/infection during neutropenic period  Description: INTERVENTIONS:  - Monitor WBC    Outcome: Progressing     Problem: SAFETY ADULT  Goal: Patient will remain free of falls  Description: INTERVENTIONS:  - Educate patient/family on patient safety including physical limitations  - Instruct patient to call for assistance with activity   - Consult OT/PT to assist with strengthening/mobility   - Keep Call bell within reach  - Keep bed low and locked with side rails adjusted as appropriate  - Keep care items and personal belongings within reach  - Initiate and maintain comfort rounds  - Make Fall Risk Sign visible to staff  - Offer Toileting every  Hours, in advance of need  - Initiate/Maintain alarm  - Obtain necessary fall risk management equipment  - Apply yellow socks and bracelet for high fall risk patients  - Consider moving patient to room near nurses station  Outcome: Progressing  Goal: Maintain or return to baseline ADL function  Description: INTERVENTIONS:  -  Assess patient's ability to carry out ADLs; assess patient's baseline for ADL function and identify physical deficits which impact ability to perform ADLs (bathing, care of mouth/teeth, toileting, grooming, dressing, etc.)  - Assess/evaluate cause of self-care deficits   - Assess range of motion  - Assess patient's mobility; develop plan if impaired  - Assess patient's need for assistive devices and provide as appropriate  - Encourage maximum independence but intervene and supervise when necessary  - Involve family in performance of ADLs  - Assess for home care needs following discharge   - Consider OT consult to assist with ADL evaluation and planning for discharge  - Provide patient education as appropriate  Outcome: Progressing  Goal: Maintains/Returns to pre admission functional level  Description: INTERVENTIONS:  - Perform BMAT or MOVE assessment daily.   - Set and communicate daily mobility goal to care team and patient/family/caregiver. - Collaborate with rehabilitation services on mobility goals if consulted  - Perform Range of Motion  times a day. - Reposition patient every  hours.   - Dangle patient  times a day  - Stand patient  times a day  - Ambulate patient times a day  - Out of bed to chair  times a day   - Out of bed for meals  times a day  - Out of bed for toileting  - Record patient progress and toleration of activity level   Outcome: Progressing     Problem: DISCHARGE PLANNING  Goal: Discharge to home or other facility with appropriate resources  Description: INTERVENTIONS:  - Identify barriers to discharge w/patient and caregiver  - Arrange for needed discharge resources and transportation as appropriate  - Identify discharge learning needs (meds, wound care, etc.)  - Arrange for interpretive services to assist at discharge as needed  - Refer to Case Management Department for coordinating discharge planning if the patient needs post-hospital services based on physician/advanced practitioner order or complex needs related to functional status, cognitive ability, or social support system  Outcome: Progressing     Problem: Knowledge Deficit  Goal: Patient/family/caregiver demonstrates understanding of disease process, treatment plan, medications, and discharge instructions  Description: Complete learning assessment and assess knowledge base.   Interventions:  - Provide teaching at level of understanding  - Provide teaching via preferred learning methods  Outcome: Progressing

## 2023-10-11 NOTE — CONSULTS
Orthopedics   Sergio Potter 75 y.o. female MRN: [id number]  Unit/Bed#: W -01      Chief Complaint:   right hand and wrist swelling, s/p cat bite    HPI:   75 y. o.female  who presents s/p cat bite to the hand. She was  two domestic cats at home, when one bit her, and then bit her a second time. Puncture marks are on the volar aspect of the palm. Prior to admission she was febrile to 101.9. Seh was seen in urgent care, and sent to the hospital for further evaluation. At time of consultation she reports she has had marked improvement in swelling and pain in the right hand. Overall reports about a 50% improvement to date. She is currently elevating her hand on blankets, and is under mild compression. She has no other complaints at present. Is not sure if her cats were vaccinated. But reports they have been to the vet. Her tetanus was updated in the ER.      Review Of Systems:   Skin: as per HPI  Neuro: See HPI  Musculoskeletal: See HPI  14 point review of systems negative except as stated above     Past Medical History:   Past Medical History:   Diagnosis Date    Fibrocystic breast changes of both breasts     Multiple food allergies     Post menopausal problems        Past Surgical History:   Past Surgical History:   Procedure Laterality Date    BREAST LUMPECTOMY Right     THYROID SURGERY      TOTAL ABDOMINAL HYSTERECTOMY W/ BILATERAL SALPINGOOPHORECTOMY Bilateral        Family History:  Family history reviewed and non-contributory  Family History   Problem Relation Age of Onset    Lung cancer Mother     Diabetes Father     Breast cancer Sister     Diabetes Sister     Breast cancer Sister     Stomach cancer Child        Social History:  Social History     Socioeconomic History    Marital status: /Civil Union     Spouse name: None    Number of children: None    Years of education: None    Highest education level: None   Occupational History    None   Tobacco Use    Smoking status: Never Passive exposure: Never    Smokeless tobacco: Never   Vaping Use    Vaping Use: Never used   Substance and Sexual Activity    Alcohol use: Never    Drug use: Never    Sexual activity: Yes     Partners: Male   Other Topics Concern    None   Social History Narrative    None     Social Determinants of Health     Financial Resource Strain: Not on file   Food Insecurity: Not on file   Transportation Needs: Not on file   Physical Activity: Not on file   Stress: Not on file   Social Connections: Not on file   Intimate Partner Violence: Not on file   Housing Stability: Not on file       Allergies: Allergies   Allergen Reactions    Sugar-Protein-Starch - Food Allergy Hives           Labs:  0   Lab Value Date/Time    HCT 33.0 (L) 10/11/2023 0604    HCT 38.9 10/10/2023 1458    HGB 10.9 (L) 10/11/2023 0604    HGB 13.3 10/10/2023 1458    WBC 11.73 (H) 10/11/2023 0604    WBC 9.67 10/10/2023 1458       Meds:    Current Facility-Administered Medications:     acetaminophen (TYLENOL) tablet 650 mg, 650 mg, Oral, Q6H PRN, Brenda Finner, DO    ampicillin-sulbactam (UNASYN) 3 g in sodium chloride 0.9 % 100 mL IVPB, 3 g, Intravenous, Q6H, Brenda Finner, DO, Last Rate: 200 mL/hr at 10/11/23 0444, 3 g at 10/11/23 0444    enoxaparin (LOVENOX) subcutaneous injection 40 mg, 40 mg, Subcutaneous, Daily, Brenda Finner, DO    ketorolac (TORADOL) injection 30 mg, 30 mg, Intravenous, Q6H [address], Brenda Finner, DO, 30 mg at 10/11/23 0549    ondansetron (ZOFRAN) injection 4 mg, 4 mg, Intravenous, Q6H PRN, Brenda Finner, DO    oxyCODONE (ROXICODONE) IR tablet 5 mg, 5 mg, Oral, Q4H PRN, Brenda Finner, DO    oxyCODONE (ROXICODONE) split tablet 2.5 mg, 2.5 mg, Oral, Q4H PRN, Brenda Finner, DO    Blood Culture:   Lab Results   Component Value Date    BLOODCX Received in Microbiology Lab. Culture in Progress. 10/10/2023    BLOODCX Received in Microbiology Lab. Culture in Progress.  10/10/2023       Wound Culture:   No results found for: "WOUNDCULT"    Ins and Outs:  No intake/output data recorded. Physical Exam:   BP (!) 80/48 (BP Location: Left arm) Comment: provider notified  Pulse 78   Temp 97.8 °F (36.6 °C)   Resp 18   Ht 5' (1.524 m)   Wt 49.9 kg (110 lb 0.2 oz)   SpO2 96%   BMI 21.48 kg/m²   Gen: No acute distress, resting comfortably in bed  HEENT: Eyes clear, moist mucus membranes, hearing intact  Respiratory: No audible wheezing or stridor  Cardiovascular: Well Perfused peripherally, 2+ distal pulse  Abdomen: nondistended, no peritoneal signs  Musculoskeletal: right Hand  Skin: Please see wound images for full characterization of wounds  She has diffuse swelling over the hand and into the forearm. She has puncture marks over the volar palm. No active purulence noted/appreciated. Tendons: FDP and FDS intact to all digits. FPL intact. Extensor mechanism intact to all digits. No significant pain with any flexion or extension of the digits or thumb. No significant pain with extension/flexion of the wrist.   Able to make a loose composite fist.   Sensation intact Axillary, Musculocutaneous, Radial, Ulna, and Median  5/5 motor to Axillary, Musculocutaneous, Radial, Ulna, and Median  2+ Radial & Ulnar Pulse  Musculature is soft and compressible               Radiology:   I personally reviewed the films. Imaging reviewed and discussed with Dr. Rubén Banks. X-rays right hand: no acute osseous abnormality  CT right upper extremity: no deep intramuscular collection. Cellulitic changes.     _*_*_*_*_*_*_*_*_*_*_*_*_*_*_*_*_*_*_*_*_*_*_*_*_*_*_*_*_*_*_*_*_*_*_*_*_*_*_*_*_*    Assessment:  75 y. o.female with  right hand cellulitis. Plan:   Cont. abx per primary team  Elevation of hand. Encouraged digital range of motion. Nonweight bearing to right hand. Heat to affected area  Analgesics for pain per primary team.   No immediate surgical intervention planned today.    NPO after midnight as precaution for re-evaluation in AM.   Discussed with patient at bedside. Body mass index is 21.48 kg/m². Dispo: Hand surgery will follow. Case reviewed and discussed with Dr. Sarah Moreno.      Melissa Arevalo PA-C

## 2023-10-11 NOTE — PLAN OF CARE
Problem: PAIN - ADULT  Goal: Verbalizes/displays adequate comfort level or baseline comfort level  Description: Interventions:  - Encourage patient to monitor pain and request assistance  - Assess pain using appropriate pain scale  - Administer analgesics based on type and severity of pain and evaluate response  - Implement non-pharmacological measures as appropriate and evaluate response  - Consider cultural and social influences on pain and pain management  - Notify physician/advanced practitioner if interventions unsuccessful or patient reports new pain  Outcome: Progressing     Problem: INFECTION - ADULT  Goal: Absence or prevention of progression during hospitalization  Description: INTERVENTIONS:  - Assess and monitor for signs and symptoms of infection  - Monitor lab/diagnostic results  - Monitor all insertion sites, i.e. indwelling lines, tubes, and drains  - Monitor endotracheal if appropriate and nasal secretions for changes in amount and color  - [place] appropriate cooling/warming therapies per order  - Administer medications as ordered  - Instruct and encourage patient and family to use good hand hygiene technique  - Identify and instruct in appropriate isolation precautions for identified infection/condition  Outcome: Progressing  Goal: Absence of fever/infection during neutropenic period  Description: INTERVENTIONS:  - Monitor WBC    Outcome: Progressing     Problem: SAFETY ADULT  Goal: Patient will remain free of falls  Description: INTERVENTIONS:  - Educate patient/family on patient safety including physical limitations  - Instruct patient to call for assistance with activity   - Consult OT/PT to assist with strengthening/mobility   - Keep Call bell within reach  - Keep bed low and locked with side rails adjusted as appropriate  - Keep care items and personal belongings within reach  - Initiate and maintain comfort rounds  - Make Fall Risk Sign visible to staff  - Offer Toileting every  Hours, in advance of need  - Initiate/Maintain alarm  - Obtain necessary fall risk management equipment  - Apply yellow socks and bracelet for high fall risk patients  - Consider moving patient to room near nurses station  Outcome: Progressing  Goal: Maintain or return to baseline ADL function  Description: INTERVENTIONS:  -  Assess patient's ability to carry out ADLs; assess patient's baseline for ADL function and identify physical deficits which impact ability to perform ADLs (bathing, care of mouth/teeth, toileting, grooming, dressing, etc.)  - Assess/evaluate cause of self-care deficits   - Assess range of motion  - Assess patient's mobility; develop plan if impaired  - Assess patient's need for assistive devices and provide as appropriate  - Encourage maximum independence but intervene and supervise when necessary  - Involve family in performance of ADLs  - Assess for home care needs following discharge   - Consider OT consult to assist with ADL evaluation and planning for discharge  - Provide patient education as appropriate  Outcome: Progressing  Goal: Maintains/Returns to pre admission functional level  Description: INTERVENTIONS:  - Perform BMAT or MOVE assessment daily.   - Set and communicate daily mobility goal to care team and patient/family/caregiver. - Collaborate with rehabilitation services on mobility goals if consulted  - Perform Range of Motion  times a day. - Reposition patient every  hours.   - Dangle patient  times a day  - Stand patient  times a day  - Ambulate patient times a day  - Out of bed to chair  times a day   - Out of bed for meals  times a day  - Out of bed for toileting  - Record patient progress and toleration of activity level   Outcome: Progressing

## 2023-10-11 NOTE — ASSESSMENT & PLAN NOTE
· Treat cellulitis as above. · Patient indicates the likelihood that she will be getting rid of the cat that bit her. · Tetanus vaccine given.

## 2023-10-11 NOTE — ASSESSMENT & PLAN NOTE
Treat cellulitis as above. Patient indicates the likelihood that she will be getting rid of the cat that bit her. Tetanus vaccine given.

## 2023-10-11 NOTE — ASSESSMENT & PLAN NOTE
· Diet for many years has been limited to [employer], grits, and water. Put her on regular diet and told her to order what she knows she can take and can have food from home.

## 2023-10-11 NOTE — ASSESSMENT & PLAN NOTE
Antibiotic - Unasyn will be continued. Antipyretic - Tylenol PRN  Pain control -   Tylenol for mild pain  Oxycodone for moderate or severe pain. Monitor pain levels. Overall appears improving swelling and pain. Made NPO post MN by hand surgery as precaution for re-eval in Am. F/U Definitive plan by hand surgery   Serial hand exams. Elevate hand to help with edema.

## 2023-10-11 NOTE — ASSESSMENT & PLAN NOTE
Reports new onset nausea this afternoon with feeling of " acid in my mouth"  Will DC Toradol. Start PPI   Hgb 10.9 mildly anemic. Could be secondary to hemodilution will monitor hgb   Treat constipation.  Added stool softeners

## 2023-10-11 NOTE — UTILIZATION REVIEW
NOTIFICATION OF INPATIENT ADMISSION   AUTHORIZATION REQUEST   SERVICING FACILITY:   [address] [place] [address]. Roseanna Everett, [address] [place] [address]  Tax ID: [id number]  NPI: [id number]   ATTENDING PROVIDER:  Attending Name and NPI#: Wilberto Warner, [place] [[id number]]  Address: [address] [place] [address]. Roseanna Everett, [address] [place] [address]  Phone: [phone]     ADMISSION INFORMATION:  Place of Service: Inpatient [address] [facility]  Place of Service Code: 21  Inpatient Admission Date/Time: 10/10/23  6:09 PM  Discharge Date/Time: No discharge date for patient encounter. Admitting Diagnosis Code/Description:  Cellulitis [L03.90]     UTILIZATION REVIEW CONTACT:  [facility] Utilization   Network Utilization Review Department  Phone: [phone]  Fax: [phone]  Email: [email or web address]  Contact for approvals/pending authorizations, clinical reviews, and discharge. PHYSICIAN ADVISORY SERVICES:  Medical Necessity Denial & [facility] Review  Phone: [phone]  Fax: [phone]  Email: [email or web address]     DISCHARGE SUPPORT TEAM:  For Patients Discharge Needs & Updates  Phone: [phone] opt. 2 Fax: [phone]  Email: [email or web address]

## 2023-10-11 NOTE — H&P
[facility]  H&P  Name: See Dunne 75 y.o. female I MRN: [id number]  Unit/Bed#: W -01 I Date of Admission: 10/10/2023   Date of Service: 10/10/2023 I Hospital Day: 0      Assessment/Plan   * Cellulitis of hand  Assessment & Plan  · Antibiotic - Unasyn will be continued. · Antipyretic - Tylenol PRN  · Anti-inflammatory - Toradol scheduled. · Pain control -   · Tylenol for mild pain  · Oxycodone for moderate or severe pain. · Monitor pain levels. · Hand surgeon evaluation for possible washout or other intervention. Will be NPO after midnight until seen by hand surgery team for definitive plan of care. · Serial hand exams. · Monitor temperatures. Check WBC count in am.    · Elevate hand to help with edema. Cat bite  Assessment & Plan  · Treat cellulitis as above. · Patient indicates the likelihood that she will be getting rid of the cat that bit her. · Tetanus vaccine given. Multiple food allergies  Assessment & Plan  · Diet for many years has been limited to [address] salmon, grits, and water. Put her on regular diet and told her to order what she knows she can take and can have food from home. VTE Pharmacologic Prophylaxis:   Moderate Risk (Score 3-4) - Pharmacological DVT Prophylaxis Ordered: enoxaparin (Lovenox). Code Status: Full Code - Level 1  Discussion with family: Patient only. Anticipated Length of Stay: Patient will be admitted on an inpatient basis with an anticipated length of stay of greater than 2 midnights secondary to evaluation and treatment of the cat bite and cellulitis of hand. .    Total Time Spent on Date of Encounter in care of patient: 45 mins.  This time was spent on one or more of the following: performing physical exam; counseling and coordination of care; obtaining or reviewing history; documenting in the medical record; reviewing/ordering tests, medications or procedures; communicating with other healthcare professionals and discussing with patient's family/caregivers. Chief Complaint: hand pain and swelling    History of Present Illness:  Ashley Holguin is a 75 y.o. female with a PMH of multiple food allergies and also fibroid uterus for which she had hysterectomy who presents with swelling and pain of her right hand following 2 bites by her cat around 2 AM last night. The patient states that the cat is her own cat that she has had for about 2 years since the cat was 4 weeks old and she states that the cat has been in heat and fighting with its sister and when the patient was giving the cat water to try to quell the cat, the cat bit her. The patient states that she was in shock as the cat had never bitten her before and had then gone to push the cat away but the cat bit her second time much harder. The patient stated that her hand was "gushing blood" after the 2 bites and she initially went over to the urgent care. The patient notes that when she was at the urgent care that she had shaking chills/rigors all over to the point where she cannot even fill out papers on her own. She noted that when they took her temperature it was about 101.9 and she was given Tylenol. The patient was sent to the hospital for evaluation and when seen in the emergency department at the [address] [place] [address], she was given tetanus vaccine but because of difficulty that she was having with moving the hand, it was felt that the patient would require hand surgery evaluation to evaluate for potential deeper infection such as a tenosynovitis. The patient notes that there has been a slight improvement in the pain and mobility of the hand since she has been treated in the emergency department as well as here. The patient remains afebrile so far here currently. Review of Systems:  Review of Systems   Constitutional: Positive for chills and fever. Negative for activity change, appetite change, diaphoresis and fatigue. HENT: Negative. Eyes: Negative. Respiratory: Negative. Negative for cough and shortness of breath. Cardiovascular: Negative. Negative for chest pain and leg swelling. Gastrointestinal: Negative. Negative for abdominal pain, constipation, diarrhea, nausea and vomiting. Endocrine: Negative. Genitourinary: Negative. Musculoskeletal: Positive for arthralgias (right hand only). Negative for back pain and myalgias. Skin: Positive for rash. Allergic/Immunologic: Negative. Neurological: Negative. Hematological: Negative. Psychiatric/Behavioral: Negative. All other systems reviewed and are negative. Past Medical and Surgical History:   Past Medical History:   Diagnosis Date   • Fibrocystic breast changes of both breasts    • Multiple food allergies    • Post menopausal problems        Past Surgical History:   Procedure Laterality Date   • BREAST LUMPECTOMY Right    • THYROID SURGERY     • TOTAL ABDOMINAL HYSTERECTOMY W/ BILATERAL SALPINGOOPHORECTOMY Bilateral        Meds/Allergies:  Prior to Admission medications    Not on File     I have reviewed home medications with patient personally. She is unsure of a medication she was prescribed for post menopausal symptoms but will see if family can get the name of the med from her home by tomorrow. Allergies: Allergies   Allergen Reactions   • Sugar-Protein-Starch - Food Allergy Hives       Social History:  Marital Status: /Civil Union   Patient Pre-hospital Living Situation: Home  Patient Pre-hospital Level of Mobility: walks  Patient Pre-hospital Diet Restrictions: multiple restrictions due to allergies / intolerances.   Substance Use History:   Social History     Substance and Sexual Activity   Alcohol Use Never     Social History     Tobacco Use   Smoking Status Never   • Passive exposure: Never   Smokeless Tobacco Never     Social History     Substance and Sexual Activity   Drug Use Never       Family History:  Family History   Problem Relation Age of Onset • Lung cancer Mother    • Diabetes Father    • Breast cancer Sister    • Diabetes Sister    • Breast cancer Sister    • Stomach cancer Child        Physical Exam:     Vitals:   Blood Pressure: 99/61 (10/10/23 1830)  Pulse: (!) 112 (10/10/23 1830)  Temperature: 98.4 °F (36.9 °C) (10/10/23 1830)  Temp Source: Oral (10/10/23 1830)  Respirations: 18 (10/10/23 1830)  Height: 5' (152.4 cm) (10/10/23 1830)  Weight - Scale: 49.9 kg (110 lb 0.2 oz) (10/10/23 1830)  SpO2: 94 % (10/10/23 1830)    Physical Exam  Vitals reviewed. Constitutional:       General: She is not in acute distress. Appearance: She is not diaphoretic. HENT:      Right Ear: External ear normal.      Left Ear: External ear normal.      Nose: Nose normal.      Mouth/Throat:      Mouth: Mucous membranes are moist.      Pharynx: Oropharynx is clear. No oropharyngeal exudate or posterior oropharyngeal erythema. Eyes:      Pupils: Pupils are equal, round, and reactive to light. Neck:      Vascular: No carotid bruit. Cardiovascular:      Rate and Rhythm: Normal rate and regular rhythm. Pulses: Normal pulses. Heart sounds: No murmur heard. Pulmonary:      Effort: Pulmonary effort is normal.      Breath sounds: No wheezing, rhonchi or rales. Abdominal:      General: Bowel sounds are normal. There is no distension. Palpations: Abdomen is soft. Tenderness: There is no abdominal tenderness. Musculoskeletal:      Cervical back: Neck supple. No tenderness. Comments: Right hand with increased warmth, erythema, and edema. It is tender to palpation especially thenar pad and palmar surface where the second bite occurred. Puncture marks seen but currently no active drainage from them. Decreased range of motion seen with the fingers distally. Tenderness extends up into the forearm which is also impacted by edema. Skin:     General: Skin is warm and dry. Findings: Rash present.    Neurological:      General: No focal deficit present. Mental Status: She is alert. Psychiatric:         Mood and Affect: Mood normal.       Additional Data:     Lab Results:  Results from last 7 days   Lab Units 10/10/23  1458   WBC Thousand/uL 9.67   HEMOGLOBIN g/dL 13.3   HEMATOCRIT % 38.9   PLATELETS Thousands/uL 161   NEUTROS PCT % 86*   LYMPHS PCT % 5*   MONOS PCT % 9   EOS PCT % 0     Results from last 7 days   Lab Units 10/10/23  1458   SODIUM mmol/L 138   POTASSIUM mmol/L 3.4*   CHLORIDE mmol/L 108   CO2 mmol/L 22   BUN mg/dL 26*   CREATININE mg/dL 0.80   ANION GAP mmol/L 8   CALCIUM mg/dL 8.9   ALBUMIN g/dL 3.9   TOTAL BILIRUBIN mg/dL 0.57   ALK PHOS U/L 45   ALT U/L 69*   AST U/L 64*   GLUCOSE RANDOM mg/dL 95                 Results from last 7 days   Lab Units 10/10/23  1459 10/10/23  1458   LACTIC ACID mmol/L  --  0.8   PROCALCITONIN ng/ml 1.82*  --        Lines/Drains:  Invasive Devices     Peripheral Intravenous Line  Duration           Peripheral IV 10/10/23 Right Antecubital <1 day                    Imaging: No pertinent imaging reviewed. No orders to display       ** Please Note: This note has been constructed using a voice recognition system.  **

## 2023-10-11 NOTE — ASSESSMENT & PLAN NOTE
· Antibiotic - Unasyn will be continued. · Antipyretic - Tylenol PRN  · Anti-inflammatory - Toradol scheduled. · Pain control -   · Tylenol for mild pain  · Oxycodone for moderate or severe pain. · Monitor pain levels. · Hand surgeon evaluation for possible washout or other intervention. Will be NPO after midnight until seen by hand surgery team for definitive plan of care. · Serial hand exams. · Monitor temperatures. Check WBC count in am.    · Elevate hand to help with edema.

## 2023-10-11 NOTE — ASSESSMENT & PLAN NOTE
Diet for many years has been limited to [employer], grits, and water. Put her on regular diet and told her to order what she knows she can take and can have food from home.

## 2023-10-12 VITALS
DIASTOLIC BLOOD PRESSURE: 52 MMHG | OXYGEN SATURATION: 97 % | WEIGHT: 110.01 LBS | HEART RATE: 82 BPM | TEMPERATURE: 97 F | RESPIRATION RATE: 26 BRPM | SYSTOLIC BLOOD PRESSURE: 99 MMHG | BODY MASS INDEX: 21.6 KG/M2 | HEIGHT: 60 IN

## 2023-10-12 PROCEDURE — 99238 HOSP IP/OBS DSCHRG MGMT 30/<: CPT | Performed by: NURSE PRACTITIONER

## 2023-10-12 RX ORDER — AMOXICILLIN AND CLAVULANATE POTASSIUM 875; 125 MG/1; MG/1
1 TABLET, FILM COATED ORAL EVERY 12 HOURS SCHEDULED
Qty: 14 TABLET | Refills: 0 | Status: SHIPPED | OUTPATIENT
Start: 2023-10-12 | End: 2023-10-19

## 2023-10-12 RX ORDER — ACETAMINOPHEN 325 MG/1
975 TABLET ORAL EVERY 8 HOURS SCHEDULED
Refills: 0
Start: 2023-10-12

## 2023-10-12 RX ADMIN — VANCOMYCIN HYDROCHLORIDE 750 MG: 750 INJECTION, SOLUTION INTRAVENOUS at 00:09

## 2023-10-12 NOTE — CONSULTS
[facility]  Consult  Name: Trupti Mireles 75 y.o. female I MRN: [id number]  Unit/Bed#: ICU 15 I Date of Admission: 10/10/2023   Date of Service: 10/12/2023 I Hospital Day: 2    Inpatient consult to [facility] Jeffry Cassidy performed by: CHLOE Solis  Consult ordered by: CHLOE Teresa    Assessment/Plan   * Cellulitis of hand  Assessment & Plan  Secondary to cat bite  Unasyn was initiated by [facility] will change to Cefepime and add Vanco for septic shock  Reports swelling is improved    Septic shock ([address])  Assessment & Plan  Secondary to cellulitis from cat bite  Started on Unasyn, now changed to Cefepime and Vanco  Received 30 cc/kg and albumin for hypotension  Will start Levophed  Resend lactate  Check labs in am    Nausea  Assessment & Plan  Zofran prn    Multiple food allergies  Assessment & Plan  Supportive care    Cat bite  Assessment & Plan  See above plans         History of Present Illness     HPI: Trupti Mireles is a 75 y.o. w/ no reported pmhx who presents with swelling of right hand after a cat bite. She was admitted to the [facility] service and this morning developed hypotension that persisted until CC was consulted tonight. SHe has received 1500 cc IVF and 750 cc Albumin and remains hypotensive. Pt reported generalized weakness and pain at bite site. Will broaden antibiotics and start Levophed. History obtained from chart review and the patient. Review of Systems   Constitutional:  Positive for fatigue. HENT: Negative. Eyes: Negative. Respiratory: Negative. Cardiovascular: Negative. Gastrointestinal: Negative. Genitourinary: Negative. Musculoskeletal:         Right hand pain   Skin: Negative. Neurological: Negative. Psychiatric/Behavioral: Negative. All other systems reviewed and are negative.           Historical Information   Past Medical History:  No date: Fibrocystic breast changes of both breasts  No date: Multiple food allergies  No date: Post menopausal problems Past Surgical History:  No date: BREAST LUMPECTOMY; Right  No date: THYROID SURGERY  No date: TOTAL ABDOMINAL HYSTERECTOMY W/ BILATERAL   SALPINGOOPHORECTOMY; Bilateral   Current Outpatient Medications   Medication Instructions    acetaminophen (TYLENOL) 975 mg, Oral, Every 8 hours scheduled    amoxicillin-clavulanate (AUGMENTIN) 875-125 mg per tablet 1 tablet, Oral, Every 12 hours scheduled    Allergies   Allergen Reactions    Sugar-Protein-Starch - Food Allergy Hives      Social History     Tobacco Use    Smoking status: Never     Passive exposure: Never    Smokeless tobacco: Never   Vaping Use    Vaping Use: Never used   Substance Use Topics    Alcohol use: Never    Drug use: Never    Family History   Problem Relation Age of Onset    Lung cancer Mother     Diabetes Father     Breast cancer Sister     Diabetes Sister     Breast cancer Sister     Stomach cancer Child         Objective                            Vitals I/O      Most Recent Min/Max in 24hrs   Temp (!) 97 °F (36.1 °C) Temp  Min: 97 °F (36.1 °C)  Max: 98.5 °F (36.9 °C)   Pulse 79 Pulse  Min: 77  Max: 79   Resp (!) 26 Resp  Min: 12  Max: 26   BP 99/52 BP  Min: 80/46  Max: 113/56   O2 Sat 98 % SpO2  Min: 96 %  Max: 98 %      Intake/Output Summary (Last 24 hours) at 10/12/2023 0037  Last data filed at 10/11/2023 1400  Gross per 24 hour   Intake 6283.34 ml   Output --   Net 6283.34 ml         Diet NPO  Discharge Diet     Invasive Monitoring Physical exam    Physical Exam  Eyes:      Extraocular Movements: Extraocular movements intact. Pupils: Pupils are equal, round, and reactive to light. Skin:     General: Skin is warm. HENT:      Head: Normocephalic and atraumatic. Mouth/Throat:      Mouth: Mucous membranes are moist.   Cardiovascular:      Rate and Rhythm: Normal rate and regular rhythm. Musculoskeletal:         General: Swelling (right forearm and hand) and tenderness present.  Normal range of motion. Abdominal:      General: There is no distension. Palpations: Abdomen is soft. Tenderness: There is no abdominal tenderness. Constitutional:       General: She is awake. She is not in acute distress. Appearance: Normal appearance. She is well-developed and normal weight. Pulmonary:      Effort: Pulmonary effort is normal.      Breath sounds: Normal breath sounds. Psychiatric:         Behavior: Behavior is cooperative. Neurological:      Mental Status: She is alert. Vitals and nursing note reviewed. Diagnostic Studies      EKG: NSR  Imaging: I have personally reviewed pertinent reports.        Medications:  Scheduled PRN   acetaminophen, 975 mg, Q8H [address]  cefepime, 2,000 mg, Q12H  enoxaparin, 40 mg, Daily  pantoprazole, 40 mg, Q24H SILVIANO  polyethylene glycol, 17 g, Daily  vancomycin, 15 mg/kg, Q12H      ondansetron, 4 mg, Q6H PRN  oxyCODONE, 2.5 mg, Q4H PRN   Or  oxyCODONE, 5 mg, Q4H PRN  senna-docusate sodium, 1 tablet, BID PRN       Continuous    norepinephrine, 1-30 mcg/min  norepinephrine, 1-30 mcg/min         Labs:    CBC    Recent Labs     10/10/23  1458 10/11/23  0604 10/11/23  1729   WBC 9.67 11.73*  --    HGB 13.3 10.9* 10.2*   HCT 38.9 33.0* 30.2*    135*  --    BANDSPCT  --  30*  --      BMP    Recent Labs     10/10/23  1458 10/11/23  0604   SODIUM 138 140   K 3.4* 3.5    111*   CO2 22 25   AGAP 8 4   BUN 26* 17   CREATININE 0.80 0.77   CALCIUM 8.9 7.6*       Coags    No recent results     Additional Electrolytes  No recent results       Blood Gas    No recent results  No recent results LFTs  Recent Labs     10/10/23  1458   ALT 69*   AST 64*   ALKPHOS 45   ALB 3.9   TBILI 0.57       Infectious  Recent Labs     10/10/23  1459   PROCALCITONI 1.82*     Glucose  Recent Labs     10/10/23  1458 10/11/23  0604   GLUC 95 [address] [facility] Lida Carrera

## 2023-10-12 NOTE — DISCHARGE SUMMARY
Discharge Summary - [facility]    Patient Information: Jessa Dye 75 y.o. female MRN: [id number]  Unit/Bed#: ICU 13 Encounter: [id number]    Discharging Physician / Practitioner: CHLOE Delgado  PCP: No primary care provider on file. Admission Date: 10/10/2023  Discharge Date: 10/12/23    Reason for Admission: Cellulitis    Discharge Diagnoses:   Principal Problem:    Cellulitis of hand  Active Problems:    Septic shock ([address])    Cat bite    Multiple food allergies    Nausea  Resolved Problems:    * No resolved hospital problems. *      Consultations During Hospital Stay:  None    Procedures Performed:     None    Significant Findings:     Septic Shock    Incidental Findings:   None     Test Results Pending at Discharge (will require follow up): None     Outpatient Tests Requested:  None    Complications:  None    Hospital Course:     Jessa Dye is a 75 y.o. female patient who originally presented to the hospital on 10/10/2023 due to cellulitis, She developed septic shock and was transferred to ICU for Levophed and Broadening out her abx. Patient stated she was nauseas from the new antibiotics and I offered to get her some medication but she refused stating she needed to get home to get nutrition. She wouldn't agree to stay. Prescription for Augmentin was sent to her pharmacy and she signed out AMA. Condition at Discharge: fair     Discharge Day Visit / Exam:     * Please refer to separate progress for these details *    Discharge instructions/Information to patient and family:   See after visit summary for information provided to patient and family. Provisions for Follow-Up Care:  See after visit summary for information related to follow-up care and any pertinent home health orders. Disposition: Left against medical advice    Planned Readmission: none    Discharge Statement:  I spent 5 minutes discharging the patient. This time was spent on the day of discharge.  I had direct contact with the patient on the day of discharge. Greater than 50% of the total time was spent examining patient, answering all patient questions, arranging and discussing plan of care with patient as well as directly providing post-discharge instructions. Additional time then spent on discharge activities. Discharge Medications:  See after visit summary for reconciled discharge medications provided to patient and family.     Discharge Diet: regular diet  Activity restrictions: none    CHLOE Guerrero  10/12/2023  12:24 AM

## 2023-10-12 NOTE — ASSESSMENT & PLAN NOTE
Secondary to cellulitis from cat bite  Started on Unasyn, now changed to Cefepime and Vanco  Received 30 cc/kg and albumin for hypotension  Will start Levophed  Resend lactate  Check labs in am

## 2023-10-12 NOTE — UTILIZATION REVIEW
NOTIFICATION OF ADMISSION DISCHARGE   This is a Notification of Discharge from [address] [place] [address]. Please be advised that this patient has been discharge from our facility. Below you will find the admission and discharge date and time including the patient’s disposition. UTILIZATION REVIEW CONTACT:  Rose Chavez  Utilization   Network Utilization Review Department  Phone: [phone] x carefully listen to the prompts. All voicemails are confidential.  Email: [email or web address]     ADMISSION INFORMATION  PRESENTATION DATE: 10/10/2023  6:09 PM  OBERVATION ADMISSION DATE:   INPATIENT ADMISSION DATE: 10/10/23  6:09 PM   DISCHARGE DATE: 10/12/2023 12:50 AM   DISPOSITION:Left against medical advice or discontinued care    Network Utilization Review Department  ATTENTION: Please call with any questions or concerns to [phone] and carefully listen to the prompts so that you are directed to the right person. All voicemails are confidential.   For Discharge needs, contact Care Management DC Support Team at [phone] opt. [phone]  Send all requests for admission clinical reviews, approved or denied determinations and any other requests to dedicated fax number below belonging to the campus where the patient is receiving treatment.  List of dedicated fax numbers for the Facilities:  [place] DENIALS (Administrative/Medical Necessity) [phone]   DISCHARGE SUPPORT TEAM (Network) [phone] [address] [facility] (Maternity/NICU/Pediatrics) [phone]   [address] [place] [address] Drive [phone]   [facility] [address] [facility] [phone]   [facility] [place] [address] Dre [phone]   [id number] [address] [place] [address] [phone]

## 2023-10-12 NOTE — PLAN OF CARE
Problem: PAIN - ADULT  Goal: Verbalizes/displays adequate comfort level or baseline comfort level  Description: Interventions:  - Encourage patient to monitor pain and request assistance  - Assess pain using appropriate pain scale  - Administer analgesics based on type and severity of pain and evaluate response  - Implement non-pharmacological measures as appropriate and evaluate response  - Consider cultural and social influences on pain and pain management  - Notify physician/advanced practitioner if interventions unsuccessful or patient reports new pain  Outcome: Progressing     Problem: INFECTION - ADULT  Goal: Absence or prevention of progression during hospitalization  Description: INTERVENTIONS:  - Assess and monitor for signs and symptoms of infection  - Monitor lab/diagnostic results  - Monitor all insertion sites, i.e. indwelling lines, tubes, and drains  - Monitor endotracheal if appropriate and nasal secretions for changes in amount and color  - [place] appropriate cooling/warming therapies per order  - Administer medications as ordered  - Instruct and encourage patient and family to use good hand hygiene technique  - Identify and instruct in appropriate isolation precautions for identified infection/condition  Outcome: Progressing  Goal: Absence of fever/infection during neutropenic period  Description: INTERVENTIONS:  - Monitor WBC    Outcome: Progressing     Problem: SAFETY ADULT  Goal: Patient will remain free of falls  Description: INTERVENTIONS:  - Educate patient/family on patient safety including physical limitations  - Instruct patient to call for assistance with activity   - Consult OT/PT to assist with strengthening/mobility   - Keep Call bell within reach  - Keep bed low and locked with side rails adjusted as appropriate  - Keep care items and personal belongings within reach  - Initiate and maintain comfort rounds  - Make Fall Risk Sign visible to staff  - Offer Toileting every  Hours, in advance of need  - Initiate/Maintain alarm  - Obtain necessary fall risk management equipment  - Apply yellow socks and bracelet for high fall risk patients  - Consider moving patient to room near nurses station  Outcome: Progressing  Goal: Maintain or return to baseline ADL function  Description: INTERVENTIONS:  -  Assess patient's ability to carry out ADLs; assess patient's baseline for ADL function and identify physical deficits which impact ability to perform ADLs (bathing, care of mouth/teeth, toileting, grooming, dressing, etc.)  - Assess/evaluate cause of self-care deficits   - Assess range of motion  - Assess patient's mobility; develop plan if impaired  - Assess patient's need for assistive devices and provide as appropriate  - Encourage maximum independence but intervene and supervise when necessary  - Involve family in performance of ADLs  - Assess for home care needs following discharge   - Consider OT consult to assist with ADL evaluation and planning for discharge  - Provide patient education as appropriate  Outcome: Progressing  Goal: Maintains/Returns to pre admission functional level  Description: INTERVENTIONS:  - Perform BMAT or MOVE assessment daily.   - Set and communicate daily mobility goal to care team and patient/family/caregiver. - Collaborate with rehabilitation services on mobility goals if consulted  - Perform Range of Motion  times a day. - Reposition patient every  hours.   - Dangle patient  times a day  - Stand patient  times a day  - Ambulate patient times a day  - Out of bed to chair  times a day   - Out of bed for meals  times a day  - Out of bed for toileting  - Record patient progress and toleration of activity level   Outcome: Progressing     Problem: DISCHARGE PLANNING  Goal: Discharge to home or other facility with appropriate resources  Description: INTERVENTIONS:  - Identify barriers to discharge w/patient and caregiver  - Arrange for needed discharge resources and transportation as appropriate  - Identify discharge learning needs (meds, wound care, etc.)  - Arrange for interpretive services to assist at discharge as needed  - Refer to Case Management Department for coordinating discharge planning if the patient needs post-hospital services based on physician/advanced practitioner order or complex needs related to functional status, cognitive ability, or social support system  Outcome: Progressing     Problem: Knowledge Deficit  Goal: Patient/family/caregiver demonstrates understanding of disease process, treatment plan, medications, and discharge instructions  Description: Complete learning assessment and assess knowledge base.   Interventions:  - Provide teaching at level of understanding  - Provide teaching via preferred learning methods  Outcome: Progressing

## 2023-10-12 NOTE — ASSESSMENT & PLAN NOTE
Secondary to cat bite  Unasyn was initiated by [facility] will change to Cefepime and add Vanco for septic shock  Reports swelling is improved

## 2023-10-12 NOTE — PROGRESS NOTES
1215. Patient uncooperative with treatment plan , explained rationale multiple times and stated she is nauseous but refused to take any medications for nausea. Patient stated " I need to go home and eat and throw up to take the antibiotics". ICU CRNP at bedside and re-explained the risks and benefits of leaving against medical advice, see CRNP note. Patient decided to sign out against medical advice. AVS printed and reviewed with patient and . Verbalizes understanding.

## 2023-10-13 ENCOUNTER — TELEPHONE (OUTPATIENT)
Dept: OBGYN CLINIC | Facility: CLINIC | Age: 68
End: 2023-10-13

## 2023-10-13 ENCOUNTER — HOSPITAL ENCOUNTER (EMERGENCY)
Facility: HOSPITAL | Age: 68
Discharge: HOME | End: 2023-10-13
Attending: EMERGENCY MEDICINE
Payer: COMMERCIAL

## 2023-10-13 VITALS
HEART RATE: 71 BPM | WEIGHT: 110 LBS | RESPIRATION RATE: 18 BRPM | SYSTOLIC BLOOD PRESSURE: 131 MMHG | DIASTOLIC BLOOD PRESSURE: 69 MMHG | OXYGEN SATURATION: 98 % | TEMPERATURE: 97.6 F | BODY MASS INDEX: 21.48 KG/M2

## 2023-10-13 VITALS
OXYGEN SATURATION: 99 % | HEART RATE: 74 BPM | DIASTOLIC BLOOD PRESSURE: 63 MMHG | SYSTOLIC BLOOD PRESSURE: 130 MMHG | RESPIRATION RATE: 18 BRPM | TEMPERATURE: 98.7 F

## 2023-10-13 DIAGNOSIS — Z87.898 HISTORY OF MEDICAL PROBLEMS: Primary | ICD-10-CM

## 2023-10-13 DIAGNOSIS — Z78.9 ANTIBIOTIC DRUG INTOLERANCE: ICD-10-CM

## 2023-10-13 DIAGNOSIS — R60.0 BILATERAL LOWER EXTREMITY EDEMA: Primary | ICD-10-CM

## 2023-10-13 DIAGNOSIS — L03.113 CELLULITIS OF RIGHT UPPER EXTREMITY: ICD-10-CM

## 2023-10-13 LAB
ALBUMIN SERPL BCP-MCNC: 3.6 G/DL (ref 3.5–5)
ALP SERPL-CCNC: 39 U/L (ref 34–104)
ALT SERPL W P-5'-P-CCNC: 48 U/L (ref 7–52)
ANION GAP SERPL CALCULATED.3IONS-SCNC: 8 MMOL/L
AST SERPL W P-5'-P-CCNC: 40 U/L (ref 13–39)
BASOPHILS # BLD AUTO: 0.01 THOUSANDS/ÂΜL (ref 0–0.1)
BASOPHILS NFR BLD AUTO: 0 % (ref 0–1)
BILIRUB DIRECT SERPL-MCNC: 0.03 MG/DL (ref 0–0.2)
BILIRUB SERPL-MCNC: 0.37 MG/DL (ref 0.2–1)
BNP SERPL-MCNC: 99 PG/ML (ref 0–100)
BUN SERPL-MCNC: 21 MG/DL (ref 5–25)
CALCIUM SERPL-MCNC: 8.8 MG/DL (ref 8.4–10.2)
CHLORIDE SERPL-SCNC: 111 MMOL/L (ref 96–108)
CO2 SERPL-SCNC: 22 MMOL/L (ref 21–32)
CREAT SERPL-MCNC: 0.72 MG/DL (ref 0.6–1.3)
EOSINOPHIL # BLD AUTO: 0.04 THOUSAND/ÂΜL (ref 0–0.61)
EOSINOPHIL NFR BLD AUTO: 1 % (ref 0–6)
ERYTHROCYTE [DISTWIDTH] IN BLOOD BY AUTOMATED COUNT: 13.9 % (ref 11.6–15.1)
GFR SERPL CREATININE-BSD FRML MDRD: 86 ML/MIN/1.73SQ M
GLUCOSE SERPL-MCNC: 83 MG/DL (ref 65–140)
HCT VFR BLD AUTO: 32.5 % (ref 34.8–46.1)
HGB BLD-MCNC: 10.8 G/DL (ref 11.5–15.4)
IMM GRANULOCYTES # BLD AUTO: 0.04 THOUSAND/UL (ref 0–0.2)
IMM GRANULOCYTES NFR BLD AUTO: 1 % (ref 0–2)
LYMPHOCYTES # BLD AUTO: 1.4 THOUSANDS/ÂΜL (ref 0.6–4.47)
LYMPHOCYTES NFR BLD AUTO: 18 % (ref 14–44)
MCH RBC QN AUTO: 33.2 PG (ref 26.8–34.3)
MCHC RBC AUTO-ENTMCNC: 33.2 G/DL (ref 31.4–37.4)
MCV RBC AUTO: 100 FL (ref 82–98)
MONOCYTES # BLD AUTO: 0.61 THOUSAND/ÂΜL (ref 0.17–1.22)
MONOCYTES NFR BLD AUTO: 8 % (ref 4–12)
MRSA NOSE QL CULT: NORMAL
NEUTROPHILS # BLD AUTO: 5.53 THOUSANDS/ÂΜL (ref 1.85–7.62)
NEUTS SEG NFR BLD AUTO: 72 % (ref 43–75)
NRBC BLD AUTO-RTO: 0 /100 WBCS
PLATELET # BLD AUTO: 144 THOUSANDS/UL (ref 149–390)
PMV BLD AUTO: 10.9 FL (ref 8.9–12.7)
POTASSIUM SERPL-SCNC: 3.4 MMOL/L (ref 3.5–5.3)
PROT SERPL-MCNC: 6.4 G/DL (ref 6.4–8.4)
RBC # BLD AUTO: 3.25 MILLION/UL (ref 3.81–5.12)
SODIUM SERPL-SCNC: 141 MMOL/L (ref 135–147)
WBC # BLD AUTO: 7.63 THOUSAND/UL (ref 4.31–10.16)

## 2023-10-13 PROCEDURE — 83880 ASSAY OF NATRIURETIC PEPTIDE: CPT | Performed by: EMERGENCY MEDICINE

## 2023-10-13 PROCEDURE — 80048 BASIC METABOLIC PNL TOTAL CA: CPT | Performed by: EMERGENCY MEDICINE

## 2023-10-13 PROCEDURE — 99285 EMERGENCY DEPT VISIT HI MDM: CPT

## 2023-10-13 PROCEDURE — 99284 EMERGENCY DEPT VISIT MOD MDM: CPT | Performed by: EMERGENCY MEDICINE

## 2023-10-13 PROCEDURE — 99284 EMERGENCY DEPT VISIT MOD MDM: CPT

## 2023-10-13 PROCEDURE — 80076 HEPATIC FUNCTION PANEL: CPT | Performed by: EMERGENCY MEDICINE

## 2023-10-13 PROCEDURE — 36415 COLL VENOUS BLD VENIPUNCTURE: CPT | Performed by: EMERGENCY MEDICINE

## 2023-10-13 PROCEDURE — 85025 COMPLETE CBC W/AUTO DIFF WBC: CPT | Performed by: EMERGENCY MEDICINE

## 2023-10-13 RX ORDER — CEFUROXIME AXETIL 500 MG/1
500 TABLET ORAL EVERY 12 HOURS SCHEDULED
Qty: 20 TABLET | Refills: 0 | Status: SHIPPED | OUTPATIENT
Start: 2023-10-13 | End: 2023-10-23

## 2023-10-13 RX ORDER — POTASSIUM CHLORIDE 20 MEQ/1
40 TABLET, EXTENDED RELEASE ORAL ONCE
Status: DISCONTINUED | OUTPATIENT
Start: 2023-10-13 | End: 2023-10-13 | Stop reason: HOSPADM

## 2023-10-13 RX ORDER — METRONIDAZOLE 500 MG/1
500 TABLET ORAL ONCE
Status: COMPLETED | OUTPATIENT
Start: 2023-10-13 | End: 2023-10-13

## 2023-10-13 RX ORDER — CEFUROXIME AXETIL 250 MG/1
500 TABLET ORAL ONCE
Status: COMPLETED | OUTPATIENT
Start: 2023-10-13 | End: 2023-10-13

## 2023-10-13 RX ORDER — METRONIDAZOLE 500 MG/1
500 TABLET ORAL EVERY 8 HOURS SCHEDULED
Qty: 30 TABLET | Refills: 0 | Status: SHIPPED | OUTPATIENT
Start: 2023-10-13 | End: 2023-10-23

## 2023-10-13 RX ADMIN — CEFUROXIME AXETIL 500 MG: 250 TABLET ORAL at 16:54

## 2023-10-13 RX ADMIN — METRONIDAZOLE 500 MG: 500 TABLET ORAL at 16:54

## 2023-10-13 NOTE — TELEPHONE ENCOUNTER
----- Message from Clarisa Gray MD sent at 10/12/2023  3:02 PM EDT -----  Please call and offer follow-up tomorrow or early next week for cat bite. Please inform patient if symptoms worsen, she needs to seek immediate attention in ER. Thank you.

## 2023-10-13 NOTE — DISCHARGE INSTRUCTIONS
I offered to evaluate your leg swelling    I can't change your medications based on your feeling that your leg swelling is being caused by the medicine you are taking as this is not indicative of an allergic reaction and it can be caused by multiple life threatening medication conditions. I tied to exlpain this to you and you became angry with me. I will not change your medication without an appropriate medical evaluation. Please follow up with the doctor who prescribed this medicine to you. This is their treatment plan and I will not change it without medically assessing you. YOU REFUSED A MEDICAL EVALUATION.

## 2023-10-13 NOTE — ED PROVIDER NOTES
History  Chief Complaint   Patient presents with    Medical Problem     States she was just seeing here for leg swelling from her abx, and needs different abx. Patient is a 50-year-old female with past medical history of fibrocystic breasts, multiple food allergies with very strict nonsugar diet at home, presents to the emergency department for bilateral leg swelling that she believes is related to recent initiation of Augmentin. Patient states that she was bitten by one of her house cats on the right palm today 10/10. She was evaluated in the ED on that day and had CT scan of the right upper extremity showing cellulitic changes. She was ultimately transferred to [address] [place] [address] for hand surgery consultation at which time she developed septic shock and was transferred to the ICU for IV antibiotics and Levophed. Patient states that she believes her hypotension was related to poor nutrition while hospitalized. She states that she is unable to eat any fruits, vegetables or any sugar in her diet and was not tolerating the food that was given to her while hospitalized so she was not eating or drinking much. She states that she believes this is what caused her hypotensive. She ultimately signed out [facility] yesterday (Thursday, 10/12) and started on Augmentin. She states that she took her first dose of Augmentin last night and since yesterday started with bilateral lower extremity swelling. She states she has had swelling many years before and did take a leftover Lasix tablet she had which seemed to improve the swelling. She is requesting new antibiotic regimen as she does not want to take the Augmentin anymore. Patient states that overall her right arm and hand swelling seems to be improving. She states she has more mobility of the right hand than she did while hospitalized.   She denies any new fevers or chills, headache, dizziness or near syncope, cough, hemoptysis, chest pain, palpitations, dyspnea, abdominal pain, nausea, vomiting, change in bowel habits, urinary symptoms, diffuse skin rash or itching, leg pain, lateralizing extremity weakness or paresthesia or other focal neurologic deficits. Cats are up-to-date with rabies immunization. Patient denies any history of immunocompromise such as diabetes, cancer or being on any immunosuppressive agents. She has a follow-up appointment with hand surgery on Monday 10/16. History provided by:  Patient and medical records   used: No    Medical Problem  Associated symptoms: no abdominal pain, no chest pain, no congestion, no cough, no diarrhea, no ear pain, no fatigue, no fever, no headaches, no myalgias, no nausea, no rash, no rhinorrhea, no shortness of breath, no sore throat, no vomiting and no wheezing        Prior to Admission Medications   Prescriptions Last Dose Informant Patient Reported? Taking?   acetaminophen (TYLENOL) 325 mg tablet   No No   Sig: Take 3 tablets (975 mg total) by mouth every 8 (eight) hours   amoxicillin-clavulanate (AUGMENTIN) 875-125 mg per tablet   No No   Sig: Take 1 tablet by mouth every 12 (twelve) hours for 14 doses      Facility-Administered Medications: None       Past Medical History:   Diagnosis Date    Fibrocystic breast changes of both breasts     Multiple food allergies     Post menopausal problems        Past Surgical History:   Procedure Laterality Date    BREAST LUMPECTOMY Right     THYROID SURGERY      TOTAL ABDOMINAL HYSTERECTOMY W/ BILATERAL SALPINGOOPHORECTOMY Bilateral        Family History   Problem Relation Age of Onset    Lung cancer Mother     Diabetes Father     Breast cancer Sister     Diabetes Sister     Breast cancer Sister     Stomach cancer Child      I have reviewed and agree with the history as documented.     E-Cigarette/Vaping    E-Cigarette Use Never User      E-Cigarette/Vaping Substances    Nicotine No     THC No     CBD No     Flavoring No     Other No Unknown No      Social History     Tobacco Use    Smoking status: Never     Passive exposure: Never    Smokeless tobacco: Never   Vaping Use    Vaping Use: Never used   Substance Use Topics    Alcohol use: Never    Drug use: Never       Review of Systems   Constitutional:  Negative for appetite change, chills, fatigue and fever. HENT:  Negative for congestion, ear pain, rhinorrhea and sore throat. Respiratory:  Negative for cough, chest tightness, shortness of breath and wheezing. Cardiovascular:  Positive for leg swelling. Negative for chest pain and palpitations. Gastrointestinal:  Negative for abdominal pain, constipation, diarrhea, nausea and vomiting. Genitourinary:  Negative for dysuria, flank pain, frequency and hematuria. Musculoskeletal:  Negative for arthralgias, back pain, myalgias and neck pain. +Right hand and forearm swelling from recent cat bite (improving per patient)   Skin:  Positive for wound. Negative for color change, pallor and rash. Allergic/Immunologic: Negative for immunocompromised state. Neurological:  Negative for dizziness, syncope, weakness, light-headedness, numbness and headaches. Hematological:  Negative for adenopathy. Does not bruise/bleed easily. Psychiatric/Behavioral:  Negative for confusion and decreased concentration. All other systems reviewed and are negative. Physical Exam  Physical Exam  Vitals and nursing note reviewed. Constitutional:       General: She is not in acute distress. Appearance: Normal appearance. She is well-developed. She is not ill-appearing, toxic-appearing or diaphoretic. HENT:      Head: Normocephalic and atraumatic. Right Ear: External ear normal.      Left Ear: External ear normal.      Mouth/Throat:      Mouth: Mucous membranes are moist.      Pharynx: Oropharynx is clear. Eyes:      Extraocular Movements: Extraocular movements intact.       Conjunctiva/sclera: Conjunctivae normal.   Neck: Vascular: No JVD. Cardiovascular:      Rate and Rhythm: Normal rate and regular rhythm. Pulses: Normal pulses. Heart sounds: Normal heart sounds. No murmur heard. No friction rub. No gallop. Pulmonary:      Effort: Pulmonary effort is normal. No respiratory distress. Breath sounds: Normal breath sounds. No wheezing, rhonchi or rales. Abdominal:      General: There is no distension. Palpations: Abdomen is soft. Tenderness: There is no abdominal tenderness. There is no guarding or rebound. Musculoskeletal:         General: No swelling or tenderness. Normal range of motion. Cervical back: Normal range of motion and neck supple. No rigidity. Right lower leg: Edema present. Left lower leg: Edema present. Comments: Bilateral lower leg 1-2+ pitting edema. No unilateral calf swelling or tenderness in the bilateral lower extremities. Bilateral upper and lower extremities are neurovascularly intact. Lymphadenopathy:      Cervical: No cervical adenopathy. Skin:     General: Skin is warm and dry. Coloration: Skin is not pale. Findings: No rash. Comments: Erythema, warmth and soft tissue swelling over the right hand, mostly dorsal aspect. There is swelling without significant erythema over the right forearm. No soft tissue crepitus. Superficial wound over the palmar aspect of the right hand appears to be healing well. No purulent drainage or fluctuance. Neurological:      General: No focal deficit present. Mental Status: She is alert and oriented to person, place, and time. Sensory: No sensory deficit. Motor: No weakness.    Psychiatric:         Behavior: Behavior normal.         Vital Signs  ED Triage Vitals [10/13/23 1523]   Temperature Pulse Respirations Blood Pressure SpO2   98.7 °F (37.1 °C) 74 18 130/63 99 %      Temp Source Heart Rate Source Patient Position - Orthostatic VS BP Location FiO2 (%)   Oral Monitor -- Left arm -- Pain Score       --         Vitals:    10/13/23 1523   BP: 130/63   BP Location: Left arm   Pulse: 74   Resp: 18   Temp: 98.7 °F (37.1 °C)   TempSrc: Oral   SpO2: 99%          Visual Acuity      ED Medications  Medications   cefuroxime (CEFTIN) tablet 500 mg (500 mg Oral Given 10/13/23 1654)   metroNIDAZOLE (FLAGYL) tablet 500 mg (500 mg Oral Given 10/13/23 1654)       Diagnostic Studies  Results Reviewed       Procedure Component Value Units Date/Time    B-Type Natriuretic Peptide(BNP) [[id number]]  (Normal) Collected: 10/13/23 1701    Lab Status: Final result Specimen: Blood from Arm, Right Updated: 10/13/23 1736     BNP 99 pg/mL     Basic metabolic panel [[id number]]  (Abnormal) Collected: 10/13/23 1701    Lab Status: Final result Specimen: Blood from Arm, Right Updated: 10/13/23 1731     Sodium 141 mmol/L      Potassium 3.4 mmol/L      Chloride 111 mmol/L      CO2 22 mmol/L      ANION GAP 8 mmol/L      BUN 21 mg/dL      Creatinine 0.72 mg/dL      Glucose 83 mg/dL      Calcium 8.8 mg/dL      eGFR 86 ml/min/1.73sq m     Narrative:      [facility] guidelines for Chronic Kidney Disease (CKD):     Stage 1 with normal or high GFR (GFR > 90 mL/min/1.73 square meters)    Stage 2 Mild CKD (GFR = 60-89 mL/min/1.73 square meters)    Stage 3A Moderate CKD (GFR = 45-59 mL/min/1.73 square meters)    Stage 3B Moderate CKD (GFR = 30-44 mL/min/1.73 square meters)    Stage 4 Severe CKD (GFR = 15-29 mL/min/1.73 square meters)    Stage 5 End Stage CKD (GFR <15 mL/min/1.73 square meters)  Note: GFR calculation is accurate only with a steady state creatinine    Hepatic function panel [[id number]]  (Abnormal) Collected: 10/13/23 1701    Lab Status: Final result Specimen: Blood from Arm, Right Updated: 10/13/23 1731     Total Bilirubin 0.37 mg/dL      Bilirubin, Direct 0.03 mg/dL      Alkaline Phosphatase 39 U/L      AST 40 U/L      ALT 48 U/L      Total Protein 6.4 g/dL      Albumin 3.6 g/dL     CBC and differential [[id number]]  (Abnormal) Collected: 10/13/23 1701    Lab Status: Final result Specimen: Blood from Arm, Right Updated: 10/13/23 1716     WBC 7.63 Thousand/uL      RBC 3.25 Million/uL      Hemoglobin 10.8 g/dL      Hematocrit 32.5 %       fL      MCH 33.2 pg      MCHC 33.2 g/dL      RDW 13.9 %      MPV 10.9 fL      Platelets 997 Thousands/uL      nRBC 0 /100 WBCs      Neutrophils Relative 72 %      Immat GRANS % 1 %      Lymphocytes Relative 18 %      Monocytes Relative 8 %      Eosinophils Relative 1 %      Basophils Relative 0 %      Neutrophils Absolute 5.53 Thousands/µL      Immature Grans Absolute 0.04 Thousand/uL      Lymphocytes Absolute 1.40 Thousands/µL      Monocytes Absolute 0.61 Thousand/µL      Eosinophils Absolute 0.04 Thousand/µL      Basophils Absolute 0.01 Thousands/µL                    No orders to display              Procedures  Procedures         ED Course  ED Course as of 10/14/23 0201   Fri Oct 13, 2023   1731 WBC: 7.63   1731 Hemoglobin(!): 10.8   1731 Platelet Count(!): 787  Hemoglobin and platelet count chronically mildly low and stable from recent prior labs. Leukocytosis has resolved. 1739 eGFR: 86   1739 Creatinine: 0.72   1739 BNP: 99   1739 Potassium(!): 3.4  Will replace with oral potassium. 1759 Updated patient about essentially normal blood work. I did discuss low potassium level and patient is very hesitant about taking potassium supplement as she does not tolerate vitamins. I explained to patient that this is not a vitamin and this is a normal electrolyte that your body naturally has and strongly recommended she take the supplement given that she does not eat fruits or vegetables and likely has a lack of potassium in her diet. In regards to the wound infection and cellulitis, no fevers, leukocytosis and she reports overall her symptoms are improving.   She once again does not want to continue with the Augmentin so we will transition patient to cefuroxime and Flagyl. Discussed ED return parameters. Identification of Seniors at [facility] Most Recent Value   (ISAR) Identification of Seniors at Risk    Before the illness or injury that brought you to the Emergency, did you need someone to help you on a regular basis? 0 Filed at: 10/13/2023 1525   In the last 24 hours, have you needed more help than usual? 0 Filed at: 10/13/2023 1525   Have you been hospitalized for one or more nights during the past 6 months? 0 Filed at: 10/13/2023 1525   In general, do you see well? 0 Filed at: 10/13/2023 1525   In general, do you have serious problems with your memory? 0 Filed at: 10/13/2023 1525   Do you take more than three different medications every day? 0 Filed at: 10/13/2023 1525   ISAR Score 0 Filed at: 10/13/2023 1525                        SBIRT 20yo+      Flowsheet Row Most Recent Value   Initial Alcohol Screen: US AUDIT-C     1. How often do you have a drink containing alcohol? 0 Filed at: 10/13/2023 1525   2. How many drinks containing alcohol do you have on a typical day you are drinking? 0 Filed at: 10/13/2023 1525   3a. Male UNDER 65: How often do you have five or more drinks on one occasion? 0 Filed at: 10/13/2023 1525   3b. FEMALE Any Age, or MALE 65+: How often do you have 4 or more drinks on one occassion? 0 Filed at: 10/13/2023 1525   Audit-C Score 0 Filed at: 10/13/2023 1525   DIVYA: How many times in the past year have you. .. Used an illegal drug or used a prescription medication for non-medical reasons? Never Filed at: 10/13/2023 1525                      Medical Decision Making  13-year-old female presents to the ED for request of changing her antibiotic. Patient recently started on Augmentin for wound infection and cellulitis related to recent cat bite. She was hospitalized at [facility] for hand surgery consultation but ended up signing out [address] [facility].   She believes that her bilateral leg swelling is related to the Augmentin. I did explain to patient that this is not a common side effect of antibiotics and overall very low suspicion for true antibiotic allergy. Given that patient will likely not take the Augmentin anymore, will start her on cefuroxime and Flagyl which is second line. I did explain that this is not as good as Augmentin to cover for bacteria related to cat bite but is an alternative. Patient is agreeable to getting blood work to check kidney, liver and heart function for ruling out other etiologies of her leg swelling. She denies any chest pain, dyspnea, fevers and states that overall her wound and cellulitis is improving. She has follow-up appointment on Monday with hand surgery. Will check CBC, CMP, BNP. Will give first dose of new antibiotic regimen in the ED. Amount and/or Complexity of Data Reviewed  External Data Reviewed: notes. Details: Reviewed recent ED visit and hospitalization notes from 10/10 through 10/12. Labs: ordered. Decision-making details documented in ED Course. Risk  Prescription drug management. Disposition  Final diagnoses:   Bilateral lower extremity edema   Antibiotic drug intolerance   Cellulitis of right upper extremity     Time reflects when diagnosis was documented in both MDM as applicable and the Disposition within this note       Time User Action Codes Description Comment    10/13/2023  6:00 PM Krystal DICKERSON Add [R60.0] Bilateral lower extremity edema     10/13/2023  6:00 PM Mervin Moran Add [Z78.9] Antibiotic drug intolerance     10/13/2023  6:00 PM Hernán Euniceus Add [L03.113] Cellulitis of right upper extremity           ED Disposition       ED Disposition   Discharge    Condition   Stable    Date/Time   Fri Oct 13, 2023 1502 [facility] discharge to home/self care.                    Follow-up Information       Follow up With Specialties Details Why Contact Info Additional Information    Hand surgeon  Go on 10/16/2023       [address] [place] [address] Emergency Department Emergency Medicine Go to  If symptoms worsen [address] [facility] Emergency Department, [place], [place], [address]            Discharge Medication List as of 10/13/2023  6:01 PM        START taking these medications    Details   cefuroxime (CEFTIN) 500 mg tablet Take 1 tablet (500 mg total) by mouth every 12 (twelve) hours for 10 days, Starting Fri 10/13/2023, Until Mon 10/23/2023, Normal      metroNIDAZOLE (FLAGYL) 500 mg tablet Take 1 tablet (500 mg total) by mouth every 8 (eight) hours for 10 days, Starting Fri 10/13/2023, Until Mon 10/23/2023, Normal           CONTINUE these medications which have NOT CHANGED    Details   acetaminophen (TYLENOL) 325 mg tablet Take 3 tablets (975 mg total) by mouth every 8 (eight) hours, Starting Thu 10/12/2023, No Print      amoxicillin-clavulanate (AUGMENTIN) 875-125 mg per tablet Take 1 tablet by mouth every 12 (twelve) hours for 14 doses, Starting Thu 10/12/2023, Until Thu 10/19/2023, Normal             No discharge procedures on file.     PDMP Review       None            ED Provider  Electronically Signed by             Selena Delgado DO  10/14/23 1267

## 2023-10-14 NOTE — ED PROVIDER NOTES
History  Chief Complaint   Patient presents with    Allergic Reaction     Pt states she was placed on abx and her legs swelled, states she is here for a different abx. 75year old female pt, recently in the ICU for cellulitis, left AMA comes into the ED today complaining of bilateral lower extremity swelling. She feels this is due to an allergic reaction to Augmentin and she is asking for a different antibiotic. She took lasix, however, which improved what she believes to be an allergic reaction and do not. I attempted to explain to her that I wanted to work her up for the leg swelling and we can discuss changing the antibiotic but this was not a typical reaction to the medication. The patient was angry that I would not allow her to dictate her medical care. She stated "I DON'T CARE WHAT YOU THINK, I WANT A DIFFERENT ANTIBIOTIC. YOU GAVE ME AN ANTIBIOTIC AND I'M ALLERGIC TO IT.  I TOOK LASIX AND KEPT MY FEET UP AND IT GOT BETTER."    I made multiple attempts to redirect her and explain that I will not change medical management based on her assessment and that I will need to evaluate her complaint. She continued to be insulting and degrading of my medical opinion stating "I don't care what you think". I explained I can not force her to allow for me to be evaluated but based on stable vital signs my medical screening exam was complete and she would be discharged from the hospital as I had nothing to offer her that was willing to accept. She proceeded to follow me around the ER and was told she needed to wait in her room and her discharge instructions were given to the charge nurse. History provided by:  Patient   used: No    Allergic Reaction      Prior to Admission Medications   Prescriptions Last Dose Informant Patient Reported?  Taking?   acetaminophen (TYLENOL) 325 mg tablet   No No   Sig: Take 3 tablets (975 mg total) by mouth every 8 (eight) hours amoxicillin-clavulanate (AUGMENTIN) 875-125 mg per tablet   No No   Sig: Take 1 tablet by mouth every 12 (twelve) hours for 14 doses      Facility-Administered Medications: None       Past Medical History:   Diagnosis Date    Fibrocystic breast changes of both breasts     Multiple food allergies     Post menopausal problems        Past Surgical History:   Procedure Laterality Date    BREAST LUMPECTOMY Right     THYROID SURGERY      TOTAL ABDOMINAL HYSTERECTOMY W/ BILATERAL SALPINGOOPHORECTOMY Bilateral        Family History   Problem Relation Age of Onset    Lung cancer Mother     Diabetes Father     Breast cancer Sister     Diabetes Sister     Breast cancer Sister     Stomach cancer Child      I have reviewed and agree with the history as documented.     E-Cigarette/Vaping    E-Cigarette Use Never User      E-Cigarette/Vaping Substances    Nicotine No     THC No     CBD No     Flavoring No     Other No     Unknown No      Social History     Tobacco Use    Smoking status: Never     Passive exposure: Never    Smokeless tobacco: Never   Vaping Use    Vaping Use: Never used   Substance Use Topics    Alcohol use: Never    Drug use: Never       Review of Systems    Physical Exam  Physical Exam    Vital Signs  ED Triage Vitals [10/13/23 1316]   Temperature Pulse Respirations Blood Pressure SpO2   97.6 °F (36.4 °C) 71 18 131/69 98 %      Temp Source Heart Rate Source Patient Position - Orthostatic VS BP Location FiO2 (%)   Temporal Monitor Sitting Left arm --      Pain Score       --           Vitals:    10/13/23 1316   BP: 131/69   Pulse: 71   Patient Position - Orthostatic VS: Sitting         Visual Acuity      ED Medications  Medications - No data to display    Diagnostic Studies  Results Reviewed       None                   No orders to display              Procedures  Procedures         ED Course               Identification of Seniors at [address] [place] [address] Drive Most Recent Value   (ISAR) Identification of Seniors at Risk    Before the illness or injury that brought you to the Emergency, did you need someone to help you on a regular basis? 0 Filed at: 10/13/2023 1319   In the last 24 hours, have you needed more help than usual? 0 Filed at: 10/13/2023 1319   Have you been hospitalized for one or more nights during the past 6 months? 0 Filed at: 10/13/2023 1319   In general, do you see well? 0 Filed at: 10/13/2023 1319   In general, do you have serious problems with your memory? 0 Filed at: 10/13/2023 1319   Do you take more than three different medications every day? 0 Filed at: 10/13/2023 1319   ISAR Score 0 Filed at: 10/13/2023 1319                        SBIRT 20yo+      Flowsheet Row Most Recent Value   Initial Alcohol Screen: US AUDIT-C     1. How often do you have a drink containing alcohol? 0 Filed at: 10/13/2023 1445   2. How many drinks containing alcohol do you have on a typical day you are drinking? 0 Filed at: 10/13/2023 1445   3a. Male UNDER 65: How often do you have five or more drinks on one occasion? 0 Filed at: 10/13/2023 1445   3b. FEMALE Any Age, or MALE 65+: How often do you have 4 or more drinks on one occassion? 0 Filed at: 10/13/2023 1445   Audit-C Score 0 Filed at: 10/13/2023 1445   DIVYA: How many times in the past year have you. .. Used an illegal drug or used a prescription medication for non-medical reasons? Never Filed at: 10/13/2023 1445                      Medical Decision Making           Disposition  Final diagnoses:   History of medical problems     Time reflects when diagnosis was documented in both MDM as applicable and the Disposition within this note       Time User Action Codes Description Comment    10/13/2023  3:01 PM Mallory Kim Add [T13.888] History of medical problems           ED Disposition       ED Disposition   Discharge    Condition   Stable    Date/Time   Fri Oct 13, 2023 [address] discharge to home/self care.                    Follow-up Information None         Discharge Medication List as of 10/13/2023  3:03 PM        CONTINUE these medications which have NOT CHANGED    Details   acetaminophen (TYLENOL) 325 mg tablet Take 3 tablets (975 mg total) by mouth every 8 (eight) hours, Starting Thu 10/12/2023, No Print      amoxicillin-clavulanate (AUGMENTIN) 875-125 mg per tablet Take 1 tablet by mouth every 12 (twelve) hours for 14 doses, Starting Thu 10/12/2023, Until Thu 10/19/2023, Normal             No discharge procedures on file.     PDMP Review       None            ED Provider  Electronically Signed by             Cara Sanchez DO  10/14/23 6350

## 2023-10-15 LAB
BACTERIA BLD CULT: NORMAL
BACTERIA BLD CULT: NORMAL

## 2023-10-16 ENCOUNTER — OFFICE VISIT (OUTPATIENT)
Dept: OBGYN CLINIC | Facility: CLINIC | Age: 68
End: 2023-10-16
Payer: COMMERCIAL

## 2023-10-16 VITALS — WEIGHT: 110 LBS | BODY MASS INDEX: 21.6 KG/M2 | HEIGHT: 60 IN
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
## 2023-10-16 DIAGNOSIS — S61.451A CAT BITE OF RIGHT HAND, INITIAL ENCOUNTER: Primary | ICD-10-CM

## 2023-10-16 PROCEDURE — 99213 OFFICE O/P EST LOW 20 MIN: CPT | Performed by: STUDENT IN AN ORGANIZED HEALTH CARE EDUCATION/TRAINING PROGRAM

## 2023-10-16 NOTE — PROGRESS NOTES
ORTHOPAEDIC HAND, WRIST, AND ELBOW OFFICE  VISIT       ASSESSMENT/PLAN:      Diagnoses and all orders for this visit:    Cat bite of right hand, initial encounter  -     Diclofenac Sodium (VOLTAREN) 1 %; Apply 2 g topically 4 (four) times a day          75 y.o. female with right hand at bite, DOI 10/10/23     The patient is doing well. The cat bite is healing without signs of infection. She will continue with the antibiotics as prescribed. A prescription was provided for Voltaren Gel. She was advised to use this over the dorsal aspect of her hand and to not put the Gel over the cat bites due to increased risk of infection. She will follow up in 7-10 days for repeat evaluation. Follow Up:  7-10 days       To Do Next Visit:  Re-evaluation of current issue        Quirino Felix MD  Attending, Orthopaedic Surgery  Hand, Wrist, and Elbow Surgery  [facility]    ____________________________________________________________________________________________________________________________________________      CHIEF COMPLAINT:  Chief Complaint   Patient presents with   • Right Hand - Pain       SUBJECTIVE:  Patient is a 75 y.o. female who presents today for evaluation and treatment of right hand cat bite. The patient was consulted in the ED. She was discharged on antibiotics. The patient states the antibiotics are causing GI upset. She states the hand is doing a lot better.            I have personally reviewed all the relevant PMH, PSH, SH, FH, Medications and allergies      PAST MEDICAL HISTORY:  Past Medical History:   Diagnosis Date   • Fibrocystic breast changes of both breasts    • Multiple food allergies    • Post menopausal problems        PAST SURGICAL HISTORY:  Past Surgical History:   Procedure Laterality Date   • BREAST LUMPECTOMY Right    • THYROID SURGERY     • TOTAL ABDOMINAL HYSTERECTOMY W/ BILATERAL SALPINGOOPHORECTOMY Bilateral        FAMILY HISTORY:  Family History   Problem Relation Age of Onset   • Lung cancer Mother    • Diabetes Father    • Breast cancer Sister    • Diabetes Sister    • Breast cancer Sister    • Stomach cancer Child        SOCIAL HISTORY:  Social History     Tobacco Use   • Smoking status: Never     Passive exposure: Never   • Smokeless tobacco: Never   Vaping Use   • Vaping Use: Never used   Substance Use Topics   • Alcohol use: Never   • Drug use: Never       MEDICATIONS:    Current Outpatient Medications:   •  acetaminophen (TYLENOL) 325 mg tablet, Take 3 tablets (975 mg total) by mouth every 8 (eight) hours, Disp: , Rfl: 0  •  amoxicillin-clavulanate (AUGMENTIN) 875-125 mg per tablet, Take 1 tablet by mouth every 12 (twelve) hours for 14 doses, Disp: 14 tablet, Rfl: 0  •  Diclofenac Sodium (VOLTAREN) 1 %, Apply 2 g topically 4 (four) times a day, Disp: 100 g, Rfl: 1  •  metroNIDAZOLE (FLAGYL) 500 mg tablet, Take 1 tablet (500 mg total) by mouth every 8 (eight) hours for 10 days, Disp: 30 tablet, Rfl: 0  •  cefuroxime (CEFTIN) 500 mg tablet, Take 1 tablet (500 mg total) by mouth every 12 (twelve) hours for 10 days (Patient not taking: Reported on 10/16/2023), Disp: 20 tablet, Rfl: 0    ALLERGIES:  Allergies   Allergen Reactions   • Amoxicillin-Pot Clavulanate Eye Swelling   • Peg 3350-Electrolytes Hives   • Sugar-Protein-Starch - Food Allergy Hives           REVIEW OF SYSTEMS:  Musculoskeletal:        As noted in HPI. All other systems reviewed and are negative. VITALS:  There were no vitals filed for this visit.     LABS:  HgA1c: No results found for: "HGBA1C"  BMP:   Lab Results   Component Value Date    CALCIUM 8.8 10/13/2023    K 3.4 (L) 10/13/2023    CO2 22 10/13/2023     (H) 10/13/2023    BUN 21 10/13/2023    CREATININE 0.72 10/13/2023       _____________________________________________________  PHYSICAL EXAMINATION:  General: Well developed and well nourished, alert & oriented x 3, appears comfortable  Psychiatric: Normal  HEENT: Normocephalic, Atraumatic Trachea Midline, No torticollis  Pulmonary: No audible wheezing or respiratory distress   Abdomen/GI: Non tender, non distended   Cardiovascular: No pitting edema, 2+ radial pulse   Skin: No Masses, No Erythema, No Fluctuation, No Ulcerations  Neurovascular: Sensation Intact to the Median, Ulnar, Radial Nerve, Motor Intact to the Median, Ulnar, Radial Nerve, and Pulses Intact  Musculoskeletal: Normal, except as noted in detailed exam and in HPI.       MUSCULOSKELETAL EXAMINATION:  Right hand  Full composite fist  Cat bites to volar aspect of palm at level of thenar musculature healing well  No palpable fluctuance  No erythema or drainage  No erythema or signs of infection     ___________________________________________________  STUDIES REVIEWED:  No studies to review         PROCEDURES PERFORMED:  Procedures  No Procedures performed today    _____________________________________________________      Scribe Attestation    I,:  Idania Oden MA am acting as a scribe while in the presence of the attending physician.:       I,:  Colin Soliz MD personally performed the services described in this documentation    as scribed in my presence.:

## 2023-10-20 ENCOUNTER — TELEPHONE (OUTPATIENT)
Age: 68
End: 2023-10-20

## 2023-10-20 DIAGNOSIS — K21.9 GASTRIC REFLUX: Primary | ICD-10-CM
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
## 2023-10-20 DIAGNOSIS — S61.451A CAT BITE OF RIGHT HAND, INITIAL ENCOUNTER: ICD-10-CM

## 2023-10-20 NOTE — TELEPHONE ENCOUNTER
Caller: Patient     Doctor: Abdelrahman Jha     Reason for call: Patient calling into the office. She states her hand is doing pretty good at this time and she did not use her voltaren gel due to the side effects listed. Patient is mainly calling today because she was given antibiotics in the ED and at that time they caused extreme edema and some oral issues with excessive saliva build up and acid reflux. (She is still dealing with the acid reflux and excessive saliva build up)     She states provider is aware of all of this because she was dealing with it at the time of the visit     Patient is wanting to see if provider could put a referral in for GI or primary care up in the [facility]. She did cancel her appointment with Carrie Pepper because her hand is feeling and looking much better and the distance to the office is too far. She is coming from [place]. She is looking for a call back today to see if this referral can be done. Patient has been out of work but is stating that she needs GI or pcp to fill out forms.  Needs referral     Call back#: [phone]